# Patient Record
Sex: FEMALE | Race: ASIAN | NOT HISPANIC OR LATINO | Employment: OTHER | URBAN - METROPOLITAN AREA
[De-identification: names, ages, dates, MRNs, and addresses within clinical notes are randomized per-mention and may not be internally consistent; named-entity substitution may affect disease eponyms.]

---

## 2017-02-16 ENCOUNTER — AMBULATORY - HEALTHEAST (OUTPATIENT)
Dept: CARDIOLOGY | Facility: CLINIC | Age: 27
End: 2017-02-16

## 2017-02-16 ENCOUNTER — OFFICE VISIT - HEALTHEAST (OUTPATIENT)
Dept: CARDIOLOGY | Facility: CLINIC | Age: 27
End: 2017-02-16

## 2017-02-16 DIAGNOSIS — I43 DILATED CARDIOMYOPATHY SECONDARY TO VIRAL MYOCARDITIS (H): ICD-10-CM

## 2017-02-16 DIAGNOSIS — B33.22 DILATED CARDIOMYOPATHY SECONDARY TO VIRAL MYOCARDITIS (H): ICD-10-CM

## 2017-02-16 ASSESSMENT — MIFFLIN-ST. JEOR: SCORE: 1100.52

## 2017-05-10 ENCOUNTER — COMMUNICATION - HEALTHEAST (OUTPATIENT)
Dept: CARDIOLOGY | Facility: CLINIC | Age: 27
End: 2017-05-10

## 2017-05-10 DIAGNOSIS — I43 DILATED CARDIOMYOPATHY SECONDARY TO VIRAL MYOCARDITIS (H): ICD-10-CM

## 2017-05-10 DIAGNOSIS — B33.22 DILATED CARDIOMYOPATHY SECONDARY TO VIRAL MYOCARDITIS (H): ICD-10-CM

## 2017-05-10 DIAGNOSIS — I50.21 ACUTE SYSTOLIC CONGESTIVE HEART FAILURE (H): ICD-10-CM

## 2017-05-31 ENCOUNTER — COMMUNICATION - HEALTHEAST (OUTPATIENT)
Dept: CARDIOLOGY | Facility: CLINIC | Age: 27
End: 2017-05-31

## 2017-05-31 DIAGNOSIS — I43 DILATED CARDIOMYOPATHY SECONDARY TO VIRAL MYOCARDITIS (H): ICD-10-CM

## 2017-05-31 DIAGNOSIS — B33.22 DILATED CARDIOMYOPATHY SECONDARY TO VIRAL MYOCARDITIS (H): ICD-10-CM

## 2017-06-26 ENCOUNTER — COMMUNICATION - HEALTHEAST (OUTPATIENT)
Dept: CARDIOLOGY | Facility: CLINIC | Age: 27
End: 2017-06-26

## 2017-06-26 DIAGNOSIS — I43 DILATED CARDIOMYOPATHY SECONDARY TO VIRAL MYOCARDITIS (H): ICD-10-CM

## 2017-06-26 DIAGNOSIS — B33.22 DILATED CARDIOMYOPATHY SECONDARY TO VIRAL MYOCARDITIS (H): ICD-10-CM

## 2017-07-05 ENCOUNTER — AMBULATORY - HEALTHEAST (OUTPATIENT)
Dept: CARDIOLOGY | Facility: CLINIC | Age: 27
End: 2017-07-05

## 2017-07-10 ENCOUNTER — AMBULATORY - HEALTHEAST (OUTPATIENT)
Dept: CARDIOLOGY | Facility: CLINIC | Age: 27
End: 2017-07-10

## 2017-07-10 ENCOUNTER — OFFICE VISIT - HEALTHEAST (OUTPATIENT)
Dept: CARDIOLOGY | Facility: CLINIC | Age: 27
End: 2017-07-10

## 2017-07-10 DIAGNOSIS — I43 DILATED CARDIOMYOPATHY SECONDARY TO VIRAL MYOCARDITIS (H): ICD-10-CM

## 2017-07-10 DIAGNOSIS — B33.22 DILATED CARDIOMYOPATHY SECONDARY TO VIRAL MYOCARDITIS (H): ICD-10-CM

## 2017-07-10 DIAGNOSIS — I50.42 CHRONIC COMBINED SYSTOLIC AND DIASTOLIC HEART FAILURE (H): ICD-10-CM

## 2017-07-10 ASSESSMENT — MIFFLIN-ST. JEOR: SCORE: 1074.43

## 2017-07-13 ENCOUNTER — COMMUNICATION - HEALTHEAST (OUTPATIENT)
Dept: CARDIOLOGY | Facility: CLINIC | Age: 27
End: 2017-07-13

## 2017-07-18 ENCOUNTER — HOSPITAL ENCOUNTER (OUTPATIENT)
Dept: CARDIOLOGY | Facility: CLINIC | Age: 27
Discharge: HOME | End: 2017-07-18
Attending: INTERNAL MEDICINE

## 2017-07-18 DIAGNOSIS — I43 DILATED CARDIOMYOPATHY SECONDARY TO VIRAL MYOCARDITIS (H): ICD-10-CM

## 2017-07-18 DIAGNOSIS — B33.22 DILATED CARDIOMYOPATHY SECONDARY TO VIRAL MYOCARDITIS (H): ICD-10-CM

## 2017-07-18 ASSESSMENT — MIFFLIN-ST. JEOR: SCORE: 1074.43

## 2017-07-19 LAB
AORTIC ROOT: 2.5 CM
AORTIC VALVE MEAN VELOCITY: 96.7 CM/S
AV DIMENSIONLESS INDEX VTI: 0.7
AV MEAN GRADIENT: 4 MMHG
AV PEAK GRADIENT: 7.4 MMHG
AV VALVE AREA: 1.5 CM2
AV VELOCITY RATIO: 0.6
BSA FOR ECHO PROCEDURE: 1.42 M2
CV BLOOD PRESSURE: NORMAL MMHG
CV ECHO HEIGHT: 56.5 IN
CV ECHO WEIGHT: 111 LBS
DOP CALC AO PEAK VEL: 136 CM/S
DOP CALC AO VTI: 24.5 CM
DOP CALC LVOT AREA: 2.27 CM2
DOP CALC LVOT DIAMETER: 1.7 CM
DOP CALC LVOT PEAK VEL: 76.9 CM/S
DOP CALC LVOT STROKE VOLUME: 36.5 CM3
DOP CALCLVOT PEAK VEL VTI: 16.1 CM
EJECTION FRACTION: 45 % (ref 55–75)
FRACTIONAL SHORTENING: 27.4 % (ref 28–44)
INTERVENTRICULAR SEPTUM IN END DIASTOLE: 0.82 CM (ref 0.6–0.9)
IVS/PW RATIO: 1
LA AREA 1: 13 CM2
LA AREA 2: 9.79 CM2
LEFT ATRIUM LENGTH: 3.52 CM
LEFT ATRIUM SIZE: 2.7 CM
LEFT ATRIUM TO AORTIC ROOT RATIO: 1.08 NO UNITS
LEFT ATRIUM VOLUME INDEX: 21.6 ML/M2
LEFT ATRIUM VOLUME: 30.7 CM3
LEFT VENTRICLE CARDIAC INDEX: 1.6 L/MIN/M2
LEFT VENTRICLE CARDIAC OUTPUT: 2.3 L/MIN
LEFT VENTRICLE DIASTOLIC VOLUME INDEX: 39.4 CM3/M2 (ref 34–74)
LEFT VENTRICLE DIASTOLIC VOLUME: 56 CM3 (ref 46–106)
LEFT VENTRICLE HEART RATE: 62 BPM
LEFT VENTRICLE MASS INDEX: 80.3 G/M2
LEFT VENTRICLE SYSTOLIC VOLUME INDEX: 21.8 CM3/M2 (ref 11–31)
LEFT VENTRICLE SYSTOLIC VOLUME: 31 CM3 (ref 14–42)
LEFT VENTRICULAR INTERNAL DIMENSION IN DIASTOLE: 4.35 CM (ref 3.8–5.2)
LEFT VENTRICULAR INTERNAL DIMENSION IN SYSTOLE: 3.16 CM (ref 2.2–3.5)
LEFT VENTRICULAR MASS: 114.1 G
LEFT VENTRICULAR OUTFLOW TRACT MEAN GRADIENT: 1 MMHG
LEFT VENTRICULAR OUTFLOW TRACT MEAN VELOCITY: 55.8 CM/S
LEFT VENTRICULAR OUTFLOW TRACT PEAK GRADIENT: 2 MMHG
LEFT VENTRICULAR POSTERIOR WALL IN END DIASTOLE: 0.85 CM (ref 0.6–0.9)
LV STROKE VOLUME INDEX: 25.7 ML/M2
MITRAL VALVE E/A RATIO: 1.6
MV AVERAGE E/E' RATIO: 7.5 CM/S
MV DECELERATION TIME: 141 MS
MV E'TISSUE VEL-LAT: 13 CM/S
MV E'TISSUE VEL-MED: 11.6 CM/S
MV LATERAL E/E' RATIO: 7.1
MV MEDIAL E/E' RATIO: 8
MV PEAK A VELOCITY: 57.6 CM/S
MV PEAK E VELOCITY: 92.7 CM/S
NUC REST DIASTOLIC VOLUME INDEX: 1776 LBS
NUC REST SYSTOLIC VOLUME INDEX: 56.5 IN
RIGHT VENTRICULAR INTERNAL DIMENSION IN DYSTOLE: 2.29 CM
TRICUSPID REGURGITATION PEAK PRESSURE GRADIENT: 23.4 MMHG
TRICUSPID VALVE ANULAR PLANE SYSTOLIC EXCURSION: 1.8 CM
TRICUSPID VALVE PEAK REGURGITANT VELOCITY: 242 CM/S

## 2017-07-30 ENCOUNTER — COMMUNICATION - HEALTHEAST (OUTPATIENT)
Dept: CARDIOLOGY | Facility: CLINIC | Age: 27
End: 2017-07-30

## 2017-07-30 DIAGNOSIS — I43 DILATED CARDIOMYOPATHY SECONDARY TO VIRAL MYOCARDITIS (H): ICD-10-CM

## 2017-07-30 DIAGNOSIS — B33.22 DILATED CARDIOMYOPATHY SECONDARY TO VIRAL MYOCARDITIS (H): ICD-10-CM

## 2017-10-07 ENCOUNTER — COMMUNICATION - HEALTHEAST (OUTPATIENT)
Dept: CARDIOLOGY | Facility: CLINIC | Age: 27
End: 2017-10-07

## 2017-10-07 DIAGNOSIS — I43 DILATED CARDIOMYOPATHY SECONDARY TO VIRAL MYOCARDITIS (H): ICD-10-CM

## 2017-10-07 DIAGNOSIS — B33.22 DILATED CARDIOMYOPATHY SECONDARY TO VIRAL MYOCARDITIS (H): ICD-10-CM

## 2017-11-06 ENCOUNTER — COMMUNICATION - HEALTHEAST (OUTPATIENT)
Dept: CARDIOLOGY | Facility: CLINIC | Age: 27
End: 2017-11-06

## 2017-11-06 DIAGNOSIS — I43 DILATED CARDIOMYOPATHY SECONDARY TO VIRAL MYOCARDITIS (H): ICD-10-CM

## 2017-11-06 DIAGNOSIS — B33.22 DILATED CARDIOMYOPATHY SECONDARY TO VIRAL MYOCARDITIS (H): ICD-10-CM

## 2017-11-06 DIAGNOSIS — I50.21 ACUTE SYSTOLIC CONGESTIVE HEART FAILURE (H): ICD-10-CM

## 2017-12-18 ENCOUNTER — COMMUNICATION - HEALTHEAST (OUTPATIENT)
Dept: CARDIOLOGY | Facility: CLINIC | Age: 27
End: 2017-12-18

## 2017-12-18 DIAGNOSIS — B33.22 DILATED CARDIOMYOPATHY SECONDARY TO VIRAL MYOCARDITIS (H): ICD-10-CM

## 2017-12-18 DIAGNOSIS — I43 DILATED CARDIOMYOPATHY SECONDARY TO VIRAL MYOCARDITIS (H): ICD-10-CM

## 2018-01-12 ENCOUNTER — COMMUNICATION - HEALTHEAST (OUTPATIENT)
Dept: ADMINISTRATIVE | Facility: CLINIC | Age: 28
End: 2018-01-12

## 2018-02-22 ENCOUNTER — RECORDS - HEALTHEAST (OUTPATIENT)
Dept: ADMINISTRATIVE | Facility: OTHER | Age: 28
End: 2018-02-22

## 2018-02-27 ENCOUNTER — OFFICE VISIT - HEALTHEAST (OUTPATIENT)
Dept: CARDIOLOGY | Facility: CLINIC | Age: 28
End: 2018-02-27

## 2018-02-27 DIAGNOSIS — B33.22 DILATED CARDIOMYOPATHY SECONDARY TO VIRAL MYOCARDITIS (H): ICD-10-CM

## 2018-02-27 DIAGNOSIS — I43 DILATED CARDIOMYOPATHY SECONDARY TO VIRAL MYOCARDITIS (H): ICD-10-CM

## 2018-02-27 LAB
ANION GAP SERPL CALCULATED.3IONS-SCNC: 11 MMOL/L (ref 5–18)
BUN SERPL-MCNC: 7 MG/DL (ref 8–22)
CALCIUM SERPL-MCNC: 9.1 MG/DL (ref 8.5–10.5)
CHLORIDE BLD-SCNC: 104 MMOL/L (ref 98–107)
CO2 SERPL-SCNC: 25 MMOL/L (ref 22–31)
CREAT SERPL-MCNC: 0.58 MG/DL (ref 0.6–1.1)
GFR SERPL CREATININE-BSD FRML MDRD: >60 ML/MIN/1.73M2
GLUCOSE BLD-MCNC: 109 MG/DL (ref 70–125)
POTASSIUM BLD-SCNC: 3.6 MMOL/L (ref 3.5–5)
SODIUM SERPL-SCNC: 140 MMOL/L (ref 136–145)

## 2018-02-27 ASSESSMENT — MIFFLIN-ST. JEOR: SCORE: 1063.09

## 2018-03-17 ENCOUNTER — COMMUNICATION - HEALTHEAST (OUTPATIENT)
Dept: CARDIOLOGY | Facility: CLINIC | Age: 28
End: 2018-03-17

## 2018-03-17 DIAGNOSIS — I43 DILATED CARDIOMYOPATHY SECONDARY TO VIRAL MYOCARDITIS (H): ICD-10-CM

## 2018-03-17 DIAGNOSIS — B33.22 DILATED CARDIOMYOPATHY SECONDARY TO VIRAL MYOCARDITIS (H): ICD-10-CM

## 2018-04-26 ENCOUNTER — COMMUNICATION - HEALTHEAST (OUTPATIENT)
Dept: CARDIOLOGY | Facility: CLINIC | Age: 28
End: 2018-04-26

## 2018-04-26 DIAGNOSIS — B33.22 DILATED CARDIOMYOPATHY SECONDARY TO VIRAL MYOCARDITIS (H): ICD-10-CM

## 2018-04-26 DIAGNOSIS — I43 DILATED CARDIOMYOPATHY SECONDARY TO VIRAL MYOCARDITIS (H): ICD-10-CM

## 2018-05-14 ENCOUNTER — COMMUNICATION - HEALTHEAST (OUTPATIENT)
Dept: CARDIOLOGY | Facility: CLINIC | Age: 28
End: 2018-05-14

## 2018-05-14 DIAGNOSIS — I43 DILATED CARDIOMYOPATHY SECONDARY TO VIRAL MYOCARDITIS (H): ICD-10-CM

## 2018-05-14 DIAGNOSIS — B33.22 DILATED CARDIOMYOPATHY SECONDARY TO VIRAL MYOCARDITIS (H): ICD-10-CM

## 2018-06-29 ENCOUNTER — COMMUNICATION - HEALTHEAST (OUTPATIENT)
Dept: ADMINISTRATIVE | Facility: CLINIC | Age: 28
End: 2018-06-29

## 2018-07-22 ENCOUNTER — COMMUNICATION - HEALTHEAST (OUTPATIENT)
Dept: CARDIOLOGY | Facility: CLINIC | Age: 28
End: 2018-07-22

## 2018-07-22 DIAGNOSIS — I43 DILATED CARDIOMYOPATHY SECONDARY TO VIRAL MYOCARDITIS (H): ICD-10-CM

## 2018-07-22 DIAGNOSIS — B33.22 DILATED CARDIOMYOPATHY SECONDARY TO VIRAL MYOCARDITIS (H): ICD-10-CM

## 2018-08-08 ENCOUNTER — OFFICE VISIT - HEALTHEAST (OUTPATIENT)
Dept: CARDIOLOGY | Facility: CLINIC | Age: 28
End: 2018-08-08

## 2018-08-08 ENCOUNTER — HOSPITAL ENCOUNTER (OUTPATIENT)
Dept: RADIOLOGY | Facility: CLINIC | Age: 28
Discharge: HOME | End: 2018-08-08
Attending: INTERNAL MEDICINE

## 2018-08-08 ENCOUNTER — AMBULATORY - HEALTHEAST (OUTPATIENT)
Dept: LAB | Facility: CLINIC | Age: 28
End: 2018-08-08

## 2018-08-08 DIAGNOSIS — I43 DILATED CARDIOMYOPATHY SECONDARY TO VIRAL MYOCARDITIS (H): ICD-10-CM

## 2018-08-08 DIAGNOSIS — B33.22 DILATED CARDIOMYOPATHY SECONDARY TO VIRAL MYOCARDITIS (H): ICD-10-CM

## 2018-08-08 LAB — D DIMER PPP FEU-MCNC: <0.27 FEU UG/ML

## 2018-08-08 ASSESSMENT — MIFFLIN-ST. JEOR: SCORE: 1058.55

## 2018-08-09 ENCOUNTER — COMMUNICATION - HEALTHEAST (OUTPATIENT)
Dept: CARDIOLOGY | Facility: CLINIC | Age: 28
End: 2018-08-09

## 2018-08-21 ENCOUNTER — HOSPITAL ENCOUNTER (OUTPATIENT)
Dept: CARDIOLOGY | Facility: CLINIC | Age: 28
Discharge: HOME | End: 2018-08-21
Attending: INTERNAL MEDICINE

## 2018-08-21 DIAGNOSIS — I43 DILATED CARDIOMYOPATHY SECONDARY TO VIRAL MYOCARDITIS (H): ICD-10-CM

## 2018-08-21 DIAGNOSIS — B33.22 DILATED CARDIOMYOPATHY SECONDARY TO VIRAL MYOCARDITIS (H): ICD-10-CM

## 2018-08-21 LAB
AORTIC ROOT: 2 CM
BSA FOR ECHO PROCEDURE: 1.38 M2
CV BLOOD PRESSURE: NORMAL MMHG
CV ECHO HEIGHT: 57.5 IN
CV ECHO WEIGHT: 104 LBS
DOP CALC LVOT PEAK VEL: 66.6 CM/S
DOP CALCLVOT PEAK VEL VTI: 12.9 CM
EJECTION FRACTION: 50 % (ref 55–75)
FRACTIONAL SHORTENING: 34 % (ref 28–44)
INTERVENTRICULAR SEPTUM IN END DIASTOLE: 0.85 CM (ref 0.6–0.9)
IVS/PW RATIO: 1.2
LA AREA 1: 8.21 CM2
LA AREA 2: 9.38 CM2
LEFT ATRIUM LENGTH: 3.71 CM
LEFT ATRIUM SIZE: 3 CM
LEFT ATRIUM TO AORTIC ROOT RATIO: 1.5 NO UNITS
LEFT ATRIUM VOLUME INDEX: 12.8 ML/M2
LEFT ATRIUM VOLUME: 17.6 ML
LEFT VENTRICLE DIASTOLIC VOLUME INDEX: 24.6 CM3/M2 (ref 34–74)
LEFT VENTRICLE DIASTOLIC VOLUME: 34 CM3 (ref 46–106)
LEFT VENTRICLE HEART RATE: 79 BPM
LEFT VENTRICLE MASS INDEX: 83 G/M2
LEFT VENTRICLE SYSTOLIC VOLUME INDEX: 12.3 CM3/M2 (ref 11–31)
LEFT VENTRICLE SYSTOLIC VOLUME: 17 CM3 (ref 14–42)
LEFT VENTRICULAR INTERNAL DIMENSION IN DIASTOLE: 4.59 CM (ref 3.8–5.2)
LEFT VENTRICULAR INTERNAL DIMENSION IN SYSTOLE: 3.03 CM (ref 2.2–3.5)
LEFT VENTRICULAR MASS: 114.5 G
LEFT VENTRICULAR OUTFLOW TRACT MEAN GRADIENT: 1 MMHG
LEFT VENTRICULAR OUTFLOW TRACT MEAN VELOCITY: 50.8 CM/S
LEFT VENTRICULAR OUTFLOW TRACT PEAK GRADIENT: 2 MMHG
LEFT VENTRICULAR POSTERIOR WALL IN END DIASTOLE: 0.72 CM (ref 0.6–0.9)
MITRAL VALVE DECELERATION SLOPE: 4790 MM/S2
MITRAL VALVE E/A RATIO: 1.4
MITRAL VALVE PRESSURE HALF-TIME: 66 MS
MV AVERAGE E/E' RATIO: 8.3 CM/S
MV DECELERATION TIME: 190 MS
MV E'TISSUE VEL-LAT: 12.7 CM/S
MV E'TISSUE VEL-MED: 7.12 CM/S
MV LATERAL E/E' RATIO: 6.4
MV MEDIAL E/E' RATIO: 11.5
MV PEAK A VELOCITY: 58.7 CM/S
MV PEAK E VELOCITY: 81.9 CM/S
MV VALVE AREA PRESSURE 1/2 METHOD: 3.3 CM2
NUC REST DIASTOLIC VOLUME INDEX: 1664 LBS
NUC REST SYSTOLIC VOLUME INDEX: 57.5 IN
TRICUSPID REGURGITATION PEAK PRESSURE GRADIENT: 20.6 MMHG
TRICUSPID VALVE PEAK REGURGITANT VELOCITY: 227 CM/S

## 2018-08-21 ASSESSMENT — MIFFLIN-ST. JEOR: SCORE: 1058.55

## 2018-08-23 ENCOUNTER — COMMUNICATION - HEALTHEAST (OUTPATIENT)
Dept: CARDIOLOGY | Facility: CLINIC | Age: 28
End: 2018-08-23

## 2018-11-26 ENCOUNTER — COMMUNICATION - HEALTHEAST (OUTPATIENT)
Dept: ADMINISTRATIVE | Facility: CLINIC | Age: 28
End: 2018-11-26

## 2018-12-11 ENCOUNTER — COMMUNICATION - HEALTHEAST (OUTPATIENT)
Dept: CARDIOLOGY | Facility: CLINIC | Age: 28
End: 2018-12-11

## 2018-12-11 DIAGNOSIS — I43 DILATED CARDIOMYOPATHY SECONDARY TO VIRAL MYOCARDITIS (H): ICD-10-CM

## 2018-12-11 DIAGNOSIS — B33.22 DILATED CARDIOMYOPATHY SECONDARY TO VIRAL MYOCARDITIS (H): ICD-10-CM

## 2018-12-12 ENCOUNTER — COMMUNICATION - HEALTHEAST (OUTPATIENT)
Dept: CARDIOLOGY | Facility: CLINIC | Age: 28
End: 2018-12-12

## 2018-12-12 DIAGNOSIS — I50.21 ACUTE SYSTOLIC CONGESTIVE HEART FAILURE (H): ICD-10-CM

## 2018-12-12 DIAGNOSIS — B33.22 DILATED CARDIOMYOPATHY SECONDARY TO VIRAL MYOCARDITIS (H): ICD-10-CM

## 2018-12-12 DIAGNOSIS — I43 DILATED CARDIOMYOPATHY SECONDARY TO VIRAL MYOCARDITIS (H): ICD-10-CM

## 2019-02-25 ENCOUNTER — OFFICE VISIT - HEALTHEAST (OUTPATIENT)
Dept: CARDIOLOGY | Facility: CLINIC | Age: 29
End: 2019-02-25

## 2019-02-25 DIAGNOSIS — I43 DILATED CARDIOMYOPATHY SECONDARY TO VIRAL MYOCARDITIS (H): ICD-10-CM

## 2019-02-25 DIAGNOSIS — B33.22 DILATED CARDIOMYOPATHY SECONDARY TO VIRAL MYOCARDITIS (H): ICD-10-CM

## 2019-02-25 ASSESSMENT — MIFFLIN-ST. JEOR: SCORE: 1063.09

## 2019-03-08 ENCOUNTER — COMMUNICATION - HEALTHEAST (OUTPATIENT)
Dept: CARDIOLOGY | Facility: CLINIC | Age: 29
End: 2019-03-08

## 2019-03-08 DIAGNOSIS — B33.22 DILATED CARDIOMYOPATHY SECONDARY TO VIRAL MYOCARDITIS (H): ICD-10-CM

## 2019-03-08 DIAGNOSIS — I43 DILATED CARDIOMYOPATHY SECONDARY TO VIRAL MYOCARDITIS (H): ICD-10-CM

## 2019-03-08 DIAGNOSIS — I50.21 ACUTE SYSTOLIC CONGESTIVE HEART FAILURE (H): ICD-10-CM

## 2019-04-02 ENCOUNTER — COMMUNICATION - HEALTHEAST (OUTPATIENT)
Dept: CARDIOLOGY | Facility: CLINIC | Age: 29
End: 2019-04-02

## 2019-04-02 DIAGNOSIS — B33.22 DILATED CARDIOMYOPATHY SECONDARY TO VIRAL MYOCARDITIS (H): ICD-10-CM

## 2019-04-02 DIAGNOSIS — I43 DILATED CARDIOMYOPATHY SECONDARY TO VIRAL MYOCARDITIS (H): ICD-10-CM

## 2019-06-06 ENCOUNTER — OFFICE VISIT - HEALTHEAST (OUTPATIENT)
Dept: CARDIOLOGY | Facility: CLINIC | Age: 29
End: 2019-06-06

## 2019-06-06 DIAGNOSIS — Z01.810 PREOPERATIVE CARDIOVASCULAR EXAMINATION: ICD-10-CM

## 2019-06-06 DIAGNOSIS — I42.8 NONISCHEMIC CARDIOMYOPATHY (H): ICD-10-CM

## 2019-06-06 ASSESSMENT — MIFFLIN-ST. JEOR: SCORE: 1067.62

## 2019-06-11 ASSESSMENT — MIFFLIN-ST. JEOR: SCORE: 1043.81

## 2019-06-21 ASSESSMENT — MIFFLIN-ST. JEOR: SCORE: 1043.81

## 2019-06-25 ENCOUNTER — ANESTHESIA - HEALTHEAST (OUTPATIENT)
Dept: SURGERY | Facility: HOSPITAL | Age: 29
End: 2019-06-25

## 2019-06-25 ENCOUNTER — SURGERY - HEALTHEAST (OUTPATIENT)
Dept: SURGERY | Facility: HOSPITAL | Age: 29
End: 2019-06-25

## 2019-06-25 ENCOUNTER — AMBULATORY - HEALTHEAST (OUTPATIENT)
Dept: OTHER | Facility: CLINIC | Age: 29
End: 2019-06-25

## 2019-07-24 ENCOUNTER — COMMUNICATION - HEALTHEAST (OUTPATIENT)
Dept: ADMINISTRATIVE | Facility: CLINIC | Age: 29
End: 2019-07-24

## 2019-07-25 ENCOUNTER — COMMUNICATION - HEALTHEAST (OUTPATIENT)
Dept: ADMINISTRATIVE | Facility: CLINIC | Age: 29
End: 2019-07-25

## 2019-09-26 ENCOUNTER — COMMUNICATION - HEALTHEAST (OUTPATIENT)
Dept: CARDIOLOGY | Facility: CLINIC | Age: 29
End: 2019-09-26

## 2019-09-26 DIAGNOSIS — I50.21 ACUTE SYSTOLIC CONGESTIVE HEART FAILURE (H): ICD-10-CM

## 2019-09-26 DIAGNOSIS — I43 DILATED CARDIOMYOPATHY SECONDARY TO VIRAL MYOCARDITIS (H): ICD-10-CM

## 2019-09-26 DIAGNOSIS — B33.22 DILATED CARDIOMYOPATHY SECONDARY TO VIRAL MYOCARDITIS (H): ICD-10-CM

## 2019-09-30 ENCOUNTER — RECORDS - HEALTHEAST (OUTPATIENT)
Dept: ADMINISTRATIVE | Facility: OTHER | Age: 29
End: 2019-09-30

## 2019-09-30 ENCOUNTER — AMBULATORY - HEALTHEAST (OUTPATIENT)
Dept: CARDIOLOGY | Facility: CLINIC | Age: 29
End: 2019-09-30

## 2019-10-02 ENCOUNTER — OFFICE VISIT - HEALTHEAST (OUTPATIENT)
Dept: CARDIOLOGY | Facility: CLINIC | Age: 29
End: 2019-10-02

## 2019-10-02 DIAGNOSIS — I50.22 CHRONIC SYSTOLIC CONGESTIVE HEART FAILURE (H): ICD-10-CM

## 2019-10-02 LAB — BNP SERPL-MCNC: <10 PG/ML (ref 0–64)

## 2019-10-02 ASSESSMENT — MIFFLIN-ST. JEOR: SCORE: 1052.88

## 2019-10-03 ENCOUNTER — COMMUNICATION - HEALTHEAST (OUTPATIENT)
Dept: CARDIOLOGY | Facility: CLINIC | Age: 29
End: 2019-10-03

## 2019-10-11 ENCOUNTER — HOSPITAL ENCOUNTER (OUTPATIENT)
Dept: CARDIOLOGY | Facility: CLINIC | Age: 29
Discharge: HOME | End: 2019-10-11
Attending: INTERNAL MEDICINE

## 2019-10-11 DIAGNOSIS — I50.22 CHRONIC SYSTOLIC CONGESTIVE HEART FAILURE (H): ICD-10-CM

## 2019-10-11 ASSESSMENT — MIFFLIN-ST. JEOR: SCORE: 1052.88

## 2019-10-14 ENCOUNTER — COMMUNICATION - HEALTHEAST (OUTPATIENT)
Dept: CARDIOLOGY | Facility: CLINIC | Age: 29
End: 2019-10-14

## 2019-10-14 LAB
AORTIC ROOT: 2 CM
BSA FOR ECHO PROCEDURE: 1.39 M2
CV BLOOD PRESSURE: NORMAL MMHG
CV ECHO HEIGHT: 56 IN
CV ECHO WEIGHT: 108 LBS
DOP CALC LVOT AREA: 2.01 CM2
DOP CALC LVOT DIAMETER: 1.6 CM
DOP CALC LVOT PEAK VEL: 60.9 CM/S
DOP CALC LVOT STROKE VOLUME: 23.9 CM3
DOP CALCLVOT PEAK VEL VTI: 11.9 CM
ECHO EJECTION FRACTION ESTIMATED: 55 %
EJECTION FRACTION: 53 % (ref 55–75)
FRACTIONAL SHORTENING: 30.4 % (ref 28–44)
INTERVENTRICULAR SEPTUM IN END DIASTOLE: 0.79 CM (ref 0.6–0.9)
IVS/PW RATIO: 1.1
LA AREA 1: 13 CM2
LA AREA 2: 10.4 CM2
LEFT ATRIUM LENGTH: 3.91 CM
LEFT ATRIUM SIZE: 3.1 CM
LEFT ATRIUM TO AORTIC ROOT RATIO: 1.55 NO UNITS
LEFT ATRIUM VOLUME INDEX: 21.1 ML/M2
LEFT ATRIUM VOLUME: 29.4 ML
LEFT VENTRICLE CARDIAC INDEX: 1.3 L/MIN/M2
LEFT VENTRICLE CARDIAC OUTPUT: 1.8 L/MIN
LEFT VENTRICLE DIASTOLIC VOLUME INDEX: 33.8 CM3/M2 (ref 29–61)
LEFT VENTRICLE DIASTOLIC VOLUME: 47 CM3 (ref 46–106)
LEFT VENTRICLE HEART RATE: 74 BPM
LEFT VENTRICLE MASS INDEX: 71.6 G/M2
LEFT VENTRICLE SYSTOLIC VOLUME INDEX: 15.8 CM3/M2 (ref 8–24)
LEFT VENTRICLE SYSTOLIC VOLUME: 22 CM3 (ref 14–42)
LEFT VENTRICULAR INTERNAL DIMENSION IN DIASTOLE: 4.41 CM (ref 3.8–5.2)
LEFT VENTRICULAR INTERNAL DIMENSION IN SYSTOLE: 3.07 CM (ref 2.2–3.5)
LEFT VENTRICULAR MASS: 99.5 G
LEFT VENTRICULAR OUTFLOW TRACT MEAN GRADIENT: 1 MMHG
LEFT VENTRICULAR OUTFLOW TRACT MEAN VELOCITY: 45.6 CM/S
LEFT VENTRICULAR OUTFLOW TRACT PEAK GRADIENT: 1 MMHG
LEFT VENTRICULAR POSTERIOR WALL IN END DIASTOLE: 0.69 CM (ref 0.6–0.9)
LV STROKE VOLUME INDEX: 17.2 ML/M2
MITRAL VALVE E/A RATIO: 1.6
MV AVERAGE E/E' RATIO: 9.2 CM/S
MV E'TISSUE VEL-LAT: 12 CM/S
MV E'TISSUE VEL-MED: 8.68 CM/S
MV LATERAL E/E' RATIO: 7.9
MV MEDIAL E/E' RATIO: 11
MV PEAK A VELOCITY: 61.2 CM/S
MV PEAK E VELOCITY: 95.3 CM/S
NUC REST DIASTOLIC VOLUME INDEX: 1728 LBS
NUC REST SYSTOLIC VOLUME INDEX: 56 IN
TRICUSPID REGURGITATION PEAK PRESSURE GRADIENT: 22.3 MMHG
TRICUSPID VALVE ANULAR PLANE SYSTOLIC EXCURSION: 2 CM
TRICUSPID VALVE PEAK REGURGITANT VELOCITY: 236 CM/S

## 2019-12-09 ENCOUNTER — COMMUNICATION - HEALTHEAST (OUTPATIENT)
Dept: CARDIOLOGY | Facility: CLINIC | Age: 29
End: 2019-12-09

## 2019-12-09 DIAGNOSIS — I43 DILATED CARDIOMYOPATHY SECONDARY TO VIRAL MYOCARDITIS (H): ICD-10-CM

## 2019-12-09 DIAGNOSIS — B33.22 DILATED CARDIOMYOPATHY SECONDARY TO VIRAL MYOCARDITIS (H): ICD-10-CM

## 2019-12-16 ENCOUNTER — COMMUNICATION - HEALTHEAST (OUTPATIENT)
Dept: CARDIOLOGY | Facility: CLINIC | Age: 29
End: 2019-12-16

## 2019-12-16 DIAGNOSIS — B33.22 DILATED CARDIOMYOPATHY SECONDARY TO VIRAL MYOCARDITIS (H): ICD-10-CM

## 2019-12-16 DIAGNOSIS — I43 DILATED CARDIOMYOPATHY SECONDARY TO VIRAL MYOCARDITIS (H): ICD-10-CM

## 2019-12-21 ENCOUNTER — COMMUNICATION - HEALTHEAST (OUTPATIENT)
Dept: CARDIOLOGY | Facility: CLINIC | Age: 29
End: 2019-12-21

## 2019-12-21 DIAGNOSIS — I43 DILATED CARDIOMYOPATHY SECONDARY TO VIRAL MYOCARDITIS (H): ICD-10-CM

## 2019-12-21 DIAGNOSIS — B33.22 DILATED CARDIOMYOPATHY SECONDARY TO VIRAL MYOCARDITIS (H): ICD-10-CM

## 2020-01-17 ENCOUNTER — RECORDS - HEALTHEAST (OUTPATIENT)
Dept: LAB | Facility: CLINIC | Age: 30
End: 2020-01-17

## 2020-01-17 LAB
ALBUMIN SERPL-MCNC: 4.1 G/DL (ref 3.5–5)
ALP SERPL-CCNC: 63 U/L (ref 45–120)
ALT SERPL W P-5'-P-CCNC: 21 U/L (ref 0–45)
ANION GAP SERPL CALCULATED.3IONS-SCNC: 15 MMOL/L (ref 5–18)
AST SERPL W P-5'-P-CCNC: 19 U/L (ref 0–40)
BILIRUB SERPL-MCNC: 0.4 MG/DL (ref 0–1)
BUN SERPL-MCNC: 6 MG/DL (ref 8–22)
CALCIUM SERPL-MCNC: 9.2 MG/DL (ref 8.5–10.5)
CHLORIDE BLD-SCNC: 103 MMOL/L (ref 98–107)
CO2 SERPL-SCNC: 21 MMOL/L (ref 22–31)
CREAT SERPL-MCNC: 0.52 MG/DL (ref 0.6–1.1)
GFR SERPL CREATININE-BSD FRML MDRD: >60 ML/MIN/1.73M2
GLUCOSE BLD-MCNC: 85 MG/DL (ref 70–125)
POTASSIUM BLD-SCNC: 4.3 MMOL/L (ref 3.5–5)
PROT SERPL-MCNC: 7.4 G/DL (ref 6–8)
SODIUM SERPL-SCNC: 139 MMOL/L (ref 136–145)

## 2020-01-18 LAB — BACTERIA SPEC CULT: NO GROWTH

## 2020-01-20 ENCOUNTER — RECORDS - HEALTHEAST (OUTPATIENT)
Dept: LAB | Facility: CLINIC | Age: 30
End: 2020-01-20

## 2020-01-22 ENCOUNTER — COMMUNICATION - HEALTHEAST (OUTPATIENT)
Dept: CARDIOLOGY | Facility: CLINIC | Age: 30
End: 2020-01-22

## 2020-01-22 LAB
BASOPHILS # BLD AUTO: 0.1 THOU/UL (ref 0–0.2)
BASOPHILS NFR BLD AUTO: 1 % (ref 0–2)
EOSINOPHIL # BLD AUTO: 0.2 THOU/UL (ref 0–0.4)
EOSINOPHIL NFR BLD AUTO: 3 % (ref 0–6)
ERYTHROCYTE [DISTWIDTH] IN BLOOD BY AUTOMATED COUNT: 12.2 % (ref 11–14.5)
HCT VFR BLD AUTO: 41 % (ref 35–47)
HGB BLD-MCNC: 13.3 G/DL (ref 12–16)
LYMPHOCYTES # BLD AUTO: 2.3 THOU/UL (ref 0.8–4.4)
LYMPHOCYTES NFR BLD AUTO: 35 % (ref 20–40)
MCH RBC QN AUTO: 29 PG (ref 27–34)
MCHC RBC AUTO-ENTMCNC: 32.4 G/DL (ref 32–36)
MCV RBC AUTO: 90 FL (ref 80–100)
MONOCYTES # BLD AUTO: 0.5 THOU/UL (ref 0–0.9)
MONOCYTES NFR BLD AUTO: 8 % (ref 2–10)
NEUTROPHILS # BLD AUTO: 3.4 THOU/UL (ref 2–7.7)
NEUTROPHILS NFR BLD AUTO: 52 % (ref 50–70)
PLATELET # BLD AUTO: 333 THOU/UL (ref 140–440)
PMV BLD AUTO: 9.5 FL (ref 8.5–12.5)
RBC # BLD AUTO: 4.58 MILL/UL (ref 3.8–5.4)
WBC: 6.5 THOU/UL (ref 4–11)

## 2020-04-14 ENCOUNTER — RECORDS - HEALTHEAST (OUTPATIENT)
Dept: ADMINISTRATIVE | Facility: OTHER | Age: 30
End: 2020-04-14

## 2020-04-14 ENCOUNTER — AMBULATORY - HEALTHEAST (OUTPATIENT)
Dept: CARDIOLOGY | Facility: CLINIC | Age: 30
End: 2020-04-14

## 2020-04-20 ENCOUNTER — OFFICE VISIT - HEALTHEAST (OUTPATIENT)
Dept: CARDIOLOGY | Facility: CLINIC | Age: 30
End: 2020-04-20

## 2020-04-20 DIAGNOSIS — B33.22 DILATED CARDIOMYOPATHY SECONDARY TO VIRAL MYOCARDITIS (H): ICD-10-CM

## 2020-04-20 DIAGNOSIS — I50.22 CHRONIC SYSTOLIC CONGESTIVE HEART FAILURE (H): ICD-10-CM

## 2020-04-20 DIAGNOSIS — I43 DILATED CARDIOMYOPATHY SECONDARY TO VIRAL MYOCARDITIS (H): ICD-10-CM

## 2020-06-15 ENCOUNTER — COMMUNICATION - HEALTHEAST (OUTPATIENT)
Dept: CARDIOLOGY | Facility: CLINIC | Age: 30
End: 2020-06-15

## 2020-06-15 DIAGNOSIS — I42.9 CARDIOMYOPATHY (H): ICD-10-CM

## 2020-09-08 ENCOUNTER — COMMUNICATION - HEALTHEAST (OUTPATIENT)
Dept: CARDIOLOGY | Facility: CLINIC | Age: 30
End: 2020-09-08

## 2020-09-08 DIAGNOSIS — B33.22 DILATED CARDIOMYOPATHY SECONDARY TO VIRAL MYOCARDITIS (H): ICD-10-CM

## 2020-09-08 DIAGNOSIS — I43 DILATED CARDIOMYOPATHY SECONDARY TO VIRAL MYOCARDITIS (H): ICD-10-CM

## 2020-09-09 ENCOUNTER — COMMUNICATION - HEALTHEAST (OUTPATIENT)
Dept: CARDIOLOGY | Facility: CLINIC | Age: 30
End: 2020-09-09

## 2020-09-09 DIAGNOSIS — I42.9 CARDIOMYOPATHY (H): ICD-10-CM

## 2020-09-15 ENCOUNTER — AMBULATORY - HEALTHEAST (OUTPATIENT)
Dept: CARDIOLOGY | Facility: CLINIC | Age: 30
End: 2020-09-15

## 2020-09-15 DIAGNOSIS — I43 DILATED CARDIOMYOPATHY SECONDARY TO VIRAL MYOCARDITIS (H): ICD-10-CM

## 2020-09-15 DIAGNOSIS — B33.22 DILATED CARDIOMYOPATHY SECONDARY TO VIRAL MYOCARDITIS (H): ICD-10-CM

## 2020-10-07 ENCOUNTER — RECORDS - HEALTHEAST (OUTPATIENT)
Dept: ADMINISTRATIVE | Facility: OTHER | Age: 30
End: 2020-10-07

## 2020-10-12 ENCOUNTER — OFFICE VISIT - HEALTHEAST (OUTPATIENT)
Dept: CARDIOLOGY | Facility: CLINIC | Age: 30
End: 2020-10-12

## 2020-10-12 DIAGNOSIS — I50.22 CHRONIC SYSTOLIC CONGESTIVE HEART FAILURE (H): ICD-10-CM

## 2020-10-12 ASSESSMENT — MIFFLIN-ST. JEOR: SCORE: 1065.34

## 2020-11-13 ENCOUNTER — HOSPITAL ENCOUNTER (OUTPATIENT)
Dept: CARDIOLOGY | Facility: CLINIC | Age: 30
Discharge: HOME | End: 2020-11-13
Attending: INTERNAL MEDICINE

## 2020-11-13 DIAGNOSIS — I50.22 CHRONIC SYSTOLIC CONGESTIVE HEART FAILURE (H): ICD-10-CM

## 2020-11-13 LAB
AORTIC ROOT: 2.9 CM
AORTIC VALVE MEAN VELOCITY: 98.9 CM/S
ASCENDING AORTA: 1.8 CM
AV DIMENSIONLESS INDEX VTI: 0.5
AV MEAN GRADIENT: 4 MMHG
AV PEAK GRADIENT: 6.9 MMHG
AV VALVE AREA: 1.2 CM2
AV VELOCITY RATIO: 0.5
BSA FOR ECHO PROCEDURE: 1.39 M2
CV BLOOD PRESSURE: ABNORMAL MMHG
CV ECHO HEIGHT: 55 IN
CV ECHO WEIGHT: 110 LBS
DOP CALC AO PEAK VEL: 131 CM/S
DOP CALC AO VTI: 28.9 CM
DOP CALC LVOT AREA: 2.54 CM2
DOP CALC LVOT DIAMETER: 1.8 CM
DOP CALC LVOT PEAK VEL: 63.2 CM/S
DOP CALC LVOT STROKE VOLUME: 35.1 CM3
DOP CALCLVOT PEAK VEL VTI: 13.8 CM
EJECTION FRACTION: 58 % (ref 55–75)
FRACTIONAL SHORTENING: 27 % (ref 28–44)
INTERVENTRICULAR SEPTUM IN END DIASTOLE: 0.75 CM (ref 0.6–0.9)
IVS/PW RATIO: 1.1
LA AREA 1: 13.8 CM2
LA AREA 2: 14.1 CM2
LEFT ATRIUM LENGTH: 3.98 CM
LEFT ATRIUM SIZE: 2.7 CM
LEFT ATRIUM VOLUME INDEX: 29.9 ML/M2
LEFT ATRIUM VOLUME: 41.6 ML
LEFT VENTRICLE CARDIAC INDEX: 1.9 L/MIN/M2
LEFT VENTRICLE CARDIAC OUTPUT: 2.6 L/MIN
LEFT VENTRICLE DIASTOLIC VOLUME INDEX: 58.3 CM3/M2 (ref 29–61)
LEFT VENTRICLE DIASTOLIC VOLUME: 81 CM3 (ref 46–106)
LEFT VENTRICLE HEART RATE: 74 BPM
LEFT VENTRICLE MASS INDEX: 80.3 G/M2
LEFT VENTRICLE SYSTOLIC VOLUME INDEX: 24.5 CM3/M2 (ref 8–24)
LEFT VENTRICLE SYSTOLIC VOLUME: 34 CM3 (ref 14–42)
LEFT VENTRICULAR INTERNAL DIMENSION IN DIASTOLE: 4.92 CM (ref 3.8–5.2)
LEFT VENTRICULAR INTERNAL DIMENSION IN SYSTOLE: 3.59 CM (ref 2.2–3.5)
LEFT VENTRICULAR MASS: 111.6 G
LEFT VENTRICULAR OUTFLOW TRACT MEAN GRADIENT: 1 MMHG
LEFT VENTRICULAR OUTFLOW TRACT MEAN VELOCITY: 40.5 CM/S
LEFT VENTRICULAR OUTFLOW TRACT PEAK GRADIENT: 2 MMHG
LEFT VENTRICULAR POSTERIOR WALL IN END DIASTOLE: 0.65 CM (ref 0.6–0.9)
LV STROKE VOLUME INDEX: 25.3 ML/M2
MITRAL VALVE E/A RATIO: 2.2
MV AVERAGE E/E' RATIO: 9.8 CM/S
MV DECELERATION TIME: 176 MS
MV E'TISSUE VEL-LAT: 13.3 CM/S
MV E'TISSUE VEL-MED: 7.8 CM/S
MV LATERAL E/E' RATIO: 7.7
MV MEDIAL E/E' RATIO: 13.2
MV PEAK A VELOCITY: 45.9 CM/S
MV PEAK E VELOCITY: 103 CM/S
NUC REST DIASTOLIC VOLUME INDEX: 1760 LBS
NUC REST SYSTOLIC VOLUME INDEX: 55 IN
TRICUSPID REGURGITATION PEAK PRESSURE GRADIENT: 24.2 MMHG
TRICUSPID VALVE PEAK REGURGITANT VELOCITY: 246 CM/S

## 2020-11-13 ASSESSMENT — MIFFLIN-ST. JEOR: SCORE: 1046.09

## 2021-03-03 ENCOUNTER — COMMUNICATION - HEALTHEAST (OUTPATIENT)
Dept: CARDIOLOGY | Facility: CLINIC | Age: 31
End: 2021-03-03

## 2021-03-03 DIAGNOSIS — B33.22 DILATED CARDIOMYOPATHY SECONDARY TO VIRAL MYOCARDITIS (H): ICD-10-CM

## 2021-03-03 DIAGNOSIS — I43 DILATED CARDIOMYOPATHY SECONDARY TO VIRAL MYOCARDITIS (H): ICD-10-CM

## 2021-03-11 ENCOUNTER — AMBULATORY - HEALTHEAST (OUTPATIENT)
Dept: NURSING | Facility: CLINIC | Age: 31
End: 2021-03-11

## 2021-04-01 ENCOUNTER — AMBULATORY - HEALTHEAST (OUTPATIENT)
Dept: NURSING | Facility: CLINIC | Age: 31
End: 2021-04-01

## 2021-04-30 ENCOUNTER — AMBULATORY - HEALTHEAST (OUTPATIENT)
Dept: SURGERY | Facility: HOSPITAL | Age: 31
End: 2021-04-30

## 2021-04-30 DIAGNOSIS — Z11.59 ENCOUNTER FOR SCREENING FOR OTHER VIRAL DISEASES: ICD-10-CM

## 2021-05-09 ENCOUNTER — AMBULATORY - HEALTHEAST (OUTPATIENT)
Dept: SURGERY | Facility: HOSPITAL | Age: 31
End: 2021-05-09

## 2021-05-09 DIAGNOSIS — Z11.59 ENCOUNTER FOR SCREENING FOR OTHER VIRAL DISEASES: ICD-10-CM

## 2021-05-11 ENCOUNTER — OFFICE VISIT - HEALTHEAST (OUTPATIENT)
Dept: CARDIOLOGY | Facility: CLINIC | Age: 31
End: 2021-05-11

## 2021-05-11 DIAGNOSIS — I50.22 CHRONIC SYSTOLIC CONGESTIVE HEART FAILURE (H): ICD-10-CM

## 2021-05-11 DIAGNOSIS — B33.22 DILATED CARDIOMYOPATHY SECONDARY TO VIRAL MYOCARDITIS (H): ICD-10-CM

## 2021-05-11 DIAGNOSIS — I43 DILATED CARDIOMYOPATHY SECONDARY TO VIRAL MYOCARDITIS (H): ICD-10-CM

## 2021-05-26 ENCOUNTER — COMMUNICATION - HEALTHEAST (OUTPATIENT)
Dept: CARDIOLOGY | Facility: CLINIC | Age: 31
End: 2021-05-26

## 2021-05-26 DIAGNOSIS — I43 DILATED CARDIOMYOPATHY SECONDARY TO VIRAL MYOCARDITIS (H): ICD-10-CM

## 2021-05-26 DIAGNOSIS — B33.22 DILATED CARDIOMYOPATHY SECONDARY TO VIRAL MYOCARDITIS (H): ICD-10-CM

## 2021-05-26 RX ORDER — LOSARTAN POTASSIUM 25 MG/1
TABLET ORAL
Qty: 45 TABLET | Refills: 1 | Status: SHIPPED | OUTPATIENT
Start: 2021-05-26 | End: 2021-11-16

## 2021-05-29 NOTE — PROGRESS NOTES
[facility] Progress Note    Assessment:    1.  Low cardiovascular risk for general anesthesia and surgery  2.  Mild nonischemic cardiomyopathy currently compensated    Plan:    Okay to proceed with surgery in the near future with no further preoperative cardiac testing required.  No special cardiac monitoring is needed in the perioperative.  Would continue the patient's current medical regimen    An After Visit Summary was printed and given to the patient.    Subjective:    29-year-old female who was diagnosed with a severe nonischemic cardiomyopathy in January 2015 with an apical mural thrombus noted at time of diagnosis.  Patient with medical therapy has had a significant improvement in her ejection fraction.  Her last echocardiogram was in August 2018 where a ejection fraction of 50% was obtained with no significant valvular abnormalities observed.  Over the last several months she rides a bicycle with no limiting dyspnea on exertion.  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 1 each 0     carvedilol (COREG) 25 MG tablet Take 1 tablet (25 mg total) by mouth 2 (two) times a day with meals. 180 tablet 2     digoxin (LANOXIN) 125 mcg tablet TAKE 1 TABLET (125 MCG TOTAL) BY MOUTH DAILY. 90 tablet 2     furosemide (LASIX) 20 MG tablet TAKE 1 TABLET BY MOUTH DAILY, TAKE AN EXTRA TABLET EVERY OTHER  tablet 1     losartan (COZAAR) 25 MG tablet TAKE 0.5 TABLETS (12.5 MG TOTAL) BY MOUTH DAILY. 45 tablet 2     No current facility-administered medications for this visit.        .No past surgical history on  "file.    .  Social History     Socioeconomic History     Marital status: Single     Spouse name: Not on file     Number of children: Not on file     Years of education: Not on file     Highest education level: Not on file   Occupational History     Not on file   Social Needs     Financial resource strain: Not on file     Food insecurity:     Worry: Not on file     Inability: Not on file     Transportation needs:     Medical: Not on file     Non-medical: Not on file   Tobacco Use     Smoking status: Never Smoker     Smokeless tobacco: Never Used   Substance and Sexual Activity     Alcohol use: Not on file     Drug use: Not on file     Sexual activity: Not on file   Lifestyle     Physical activity:     Days per week: Not on file     Minutes per session: Not on file     Stress: Not on file   Relationships     Social connections:     Talks on phone: Not on file     Gets together: Not on file     Attends Taoism service: Not on file     Active member of club or organization: Not on file     Attends meetings of clubs or organizations: Not on file     Relationship status: Not on file     Intimate partner violence:     Fear of current or ex partner: Not on file     Emotionally abused: Not on file     Physically abused: Not on file     Forced sexual activity: Not on file   Other Topics Concern     Not on file   Social History Narrative     Not on file       .No family history on file.  Review of Systems:  General: WNL  Eyes: WNL  Ears/Nose/Throat: WNL  Lungs: WNL  Heart: WNL  Stomach: WNL  Bladder: WNL  Muscle/Joints: WNL  Skin: WNL  Nervous System: WNL  Mental Health: WNL     Blood: WNL    Objective:     /72 (Patient Site: Right Arm, Patient Position: Sitting, Cuff Size: Adult Regular)   Pulse 68   Resp 16   Ht 4' 9.5\" (1.461 m)   Wt 106 lb (48.1 kg)   BMI 22.54 kg/m    106 lb (48.1 kg)   [unfilled]    Physical Exam:    GENERAL APPEARANCE: alert, no apparent distress  HEENT: no scleral icterus or " xanthelasma  NECK: jugular venous pressure normal  CHEST: symmetric, the lungs were clear to auscultation  CARDIOVASCULAR: regular rhythm without murmur, click, or gallop; no carotid bruits  ABDOMEN: nondistended, nontender, bowel sounds present  EXTREMITIES: no cyanosis, clubbing or edema.    Cardiac Testing:    echocardiogram from August 21, 2018 results reviewed as above      Lab Results:    LIPIDS:  No results found for: CHOL  No results found for: HDL  No results found for: LDLCALC  No results found for: TRIG  No components found for: CHOLHDL    BMP:  Lab Results   Component Value Date    CREATININE 0.58 (L) 02/27/2018    BUN 7 (L) 02/27/2018     02/27/2018    K 3.6 02/27/2018     02/27/2018    CO2 25 02/27/2018         Lg Roberts MD,F.A.C.[facility]  [phone]

## 2021-05-30 VITALS — WEIGHT: 115 LBS | BODY MASS INDEX: 24.81 KG/M2 | HEIGHT: 57 IN

## 2021-05-30 NOTE — ANESTHESIA PREPROCEDURE EVALUATION
Anesthesia Evaluation      Patient summary reviewed     Airway   Mallampati: III  Neck ROM: full   Pulmonary - negative ROS    breath sounds clear to auscultation                         Cardiovascular   Exercise tolerance: > or = 4 METS  (+) cardiomyopathy (viral),     ECG reviewed  Rhythm: regular        Neuro/Psych      Comments: Mental delay  CP    Endo/Other    (-) not pregnant     Comments: PE 2015    GI/Hepatic/Renal - negative ROS      Other findings:     NPO > 8 hrs     Results for CAILIN CORONA (MRN [id number]) as of 6/25/2019 6/25/2019 09:35  Pregnancy Test, Urine: Negative      ECH0 8/2018:    Summary       Left ventricle ejection fraction is mildly decreased. The calculated left ventricular ejection fraction is 50%.    Normal left ventricular size.    Normal right ventricular size and systolic function.    No hemodynamically significant valvular heart abnormalities.    When compared to the previous study dated 7/18/2017, LVEF is mildly higher          Dental - normal exam                        Anesthesia Plan  Planned anesthetic: MAC      Toradol if ok with surgeon    ASA 3     Anesthetic plan and risks discussed with: patient and parent/guardian  Anesthesia plan special considerations: antiemetics,   Post-op plan: routine recovery

## 2021-05-30 NOTE — ANESTHESIA CARE TRANSFER NOTE
Last vitals:   Vitals:    06/25/19 1230   BP: 148/82   Pulse: 78   Resp: 16   Temp:    SpO2: 100%     Patient's level of consciousness is drowsy  Spontaneous respirations: yes  Maintains airway independently: yes  Dentition unchanged: yes  Oropharynx: oropharynx clear of all foreign objects    QCDR Measures:  ASA# 20 - Surgical Safety Checklist: WHO surgical safety checklist completed prior to induction    PQRS# 430 - Adult PONV Prevention: 4558F - Pt received => 2 anti-emetic agents (different classes) preop & intraop  ASA# 8 - Peds PONV Prevention: NA - Not pediatric patient, not GA or 2 or more risk factors NOT present  PQRS# 424 - Rashmi-op Temp Management: 4559F - At least one body temp DOCUMENTED => 35.5C or 95.9F within required timeframe  PQRS# 426 - PACU Transfer Protocol: - Transfer of care checklist used  ASA# 14 - Acute Post-op Pain: ASA14B - Patient did NOT experience pain >= 7 out of 10

## 2021-05-30 NOTE — ANESTHESIA POSTPROCEDURE EVALUATION
Patient: Kristyn Whatley  PAPANICOLAOU SMEAR, HYSTEROSCOPY, WITH DILATION AND CURETTAGE OF UTERUS  Anesthesia type: MAC    Patient location: Phase II Recovery  Last vitals:   Vitals Value Taken Time   /81 6/25/2019 12:45 PM   Temp 36.7  C (98  F) 6/25/2019 12:25 PM   Pulse 77 6/25/2019 12:55 PM   Resp 16 6/25/2019 12:45 PM   SpO2 100 % 6/25/2019 12:55 PM   Vitals shown include unvalidated device data.  Post vital signs: stable  Level of consciousness: awake and responds to simple questions  Post-anesthesia pain: pain controlled  Post-anesthesia nausea and vomiting: no  Pulmonary: unassisted, return to baseline  Cardiovascular: stable and blood pressure at baseline  Hydration: adequate  Anesthetic events: no    QCDR Measures:  ASA# 11 - Rashmi-op Cardiac Arrest: ASA11B - Patient did NOT experience unanticipated cardiac arrest  ASA# 12 - Rashmi-op Mortality Rate: ASA12B - Patient did NOT die  ASA# 13 - PACU Re-Intubation Rate: NA - No ETT / LMA used for case  ASA# 10 - Composite Anes Safety: ASA10A - No serious adverse event    Additional Notes:

## 2021-05-31 VITALS — BODY MASS INDEX: 23.95 KG/M2 | WEIGHT: 111 LBS | HEIGHT: 57 IN

## 2021-05-31 VITALS — WEIGHT: 111 LBS | BODY MASS INDEX: 23.95 KG/M2 | HEIGHT: 57 IN

## 2021-05-31 VITALS — WEIGHT: 105 LBS | HEIGHT: 58 IN | BODY MASS INDEX: 22.04 KG/M2

## 2021-06-01 VITALS — HEIGHT: 58 IN | WEIGHT: 104 LBS | BODY MASS INDEX: 21.83 KG/M2

## 2021-06-01 VITALS — HEIGHT: 58 IN | BODY MASS INDEX: 21.83 KG/M2 | WEIGHT: 104 LBS

## 2021-06-01 NOTE — PROGRESS NOTES
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 female who comes in today for follow-up visit.  Mother reports that patient has gained some weight lately.  She also started to take frequent naps.  Patient denies chest pain or shortness of breath.    Review of Systems:   General: Weight Gain  Eyes: WNL  Ears/Nose/Throat: WNL  Lungs: WNL  Heart: WNL  Stomach: WNL  Bladder: WNL  Muscle/Joints: WNL  Skin: WNL  Nervous System: WNL  Mental Health: WNL     Blood: WNL    Objective:   /70 (Patient Site: Left Arm, Patient Position: Sitting, Cuff Size: Adult Regular)   Pulse 68   Resp 16   Ht 4' 8\" (1.422 m)   Wt 108 lb (49 kg)   BMI 24.21 kg/m    Physical Exam:  GENERAL: no distress  NECK: No JVD  LUNGS: Clear to auscultation.  CARDIAC: regular rhythm, S1 & S2 normal.  No heaves, thrills, gallops or murmurs.  ABDOMEN: flat, negative hepatosplenomegaly, soft and non-tender.  EXTREMITIES: No evidence of cyanosis, clubbing or edema.    Current Outpatient Medications   Medication Sig Dispense Refill     blood pressure test kit-medium Kit Use 1 kit As Directed 3 (three) times a week. 1 each 0     carvedilol (COREG) 25 MG tablet Take 1 tablet (25 mg total) by mouth 2 (two) times a day with meals. 180 tablet 2     digoxin (LANOXIN) 125 mcg tablet TAKE 1 TABLET (125 MCG TOTAL) BY MOUTH DAILY. (Patient taking differently: Take 125 mcg by mouth every evening.       ) 90 tablet 2     furosemide (LASIX) 20 MG tablet Take 20 mg by mouth daily as needed (for swelling).       furosemide (LASIX) 20 MG tablet TAKE 1 TABLET BY MOUTH DAILY, TAKE AN EXTRA TABLET EVERY OTHER  tablet 2     losartan " (COZAAR) 25 MG tablet TAKE 0.5 TABLETS (12.5 MG TOTAL) BY MOUTH DAILY. (Patient taking differently: Take 12.5 mg by mouth every evening.       ) 45 tablet 2     furosemide (LASIX) 20 MG tablet Take 20 mg by mouth daily.       ibuprofen (ADVIL,MOTRIN) 600 MG tablet Take 1 tablet (600 mg total) by mouth every 6 (six) hours as needed for pain. 30 tablet 0     No current facility-administered medications for this visit.        Cardiographics:    Cardiac MR August 2015   1. Mildly dilated left ventricle with normal left ventricular wall thickness.   There is moderate global hypokinesis. The quantitative left ventricular ejection   fraction is 42%.   2. In the current study, there is no evidence of delayed gadolinium enhancement   indicating myocardial scar.   3. Normal right ventricular size and function.   4. There is no obvious valvular disease.   5. There is no pericardial effusion.      Echocardiogram: August 2018    Left ventricle ejection fraction is mildly decreased. The calculated left ventricular ejection fraction is 50%.    Normal left ventricular size.    Normal right ventricular size and systolic function.    No hemodynamically significant valvular heart abnormalities.    When compared to the previous study dated 7/18/2017, LVEF is mildly higher.    Lab Results:       No results found for: CHOL  No results found for: HDL  No results found for: LDLCALC  No results found for: TRIG  BNP   Date Value Ref Range Status   07/10/2017 <10 0 - 64 pg/mL Final       Jalen (Domo)  MD Cory

## 2021-06-01 NOTE — PATIENT INSTRUCTIONS - HE
Kristyn Whatley,    It was a pleasure to see you today at the [facility].     My recommendations after this visit include:    Echo and blood work to check for fluid retention    DOMINGO Ray MD, FACC, [facility] Center[facility]IE

## 2021-06-02 VITALS — WEIGHT: 105 LBS | HEIGHT: 58 IN | BODY MASS INDEX: 22.04 KG/M2

## 2021-06-02 VITALS — WEIGHT: 106 LBS | BODY MASS INDEX: 22.25 KG/M2 | HEIGHT: 58 IN

## 2021-06-03 VITALS — WEIGHT: 108 LBS | HEIGHT: 56 IN | BODY MASS INDEX: 24.3 KG/M2

## 2021-06-03 VITALS
HEIGHT: 56 IN | BODY MASS INDEX: 24.3 KG/M2 | HEART RATE: 68 BPM | WEIGHT: 108 LBS | RESPIRATION RATE: 16 BRPM | DIASTOLIC BLOOD PRESSURE: 70 MMHG | SYSTOLIC BLOOD PRESSURE: 110 MMHG

## 2021-06-03 VITALS — BODY MASS INDEX: 23.84 KG/M2 | HEIGHT: 56 IN | WEIGHT: 106 LBS

## 2021-06-04 VITALS — BODY MASS INDEX: 25.46 KG/M2 | WEIGHT: 110 LBS | HEIGHT: 55 IN

## 2021-06-05 NOTE — TELEPHONE ENCOUNTER
----- Message from Nayana Arevalo sent at 1/22/2020  2:16 PM CST -----  General phone call:    Caller: Babita Muniz  Primary cardiologist: Dr Ray  Detailed reason for call: Mom says patient has been running a low grad fever since 12/17/19 101.9 or 99.8 it depends around 4 PM till 8 PM.  Extremely tired takes naps after work for about 3 hours.    New or active symptoms? Low grade and extremely tired   Best phone number: [phone] babita Mathur   Best time to contact: Anytime  Ok to leave a detailed message? Yes  Device? no    Additional Info: Pt seen pcp last Friday.  Had lab work done today at 's outpatient lab.         Attempted to call pt's mother to discuss. LM asking her to call back to discuss. -kcl    Cristy calls to discuss her daughter who has been having an on/off low grade fever. She asks if a low-grade fever could be related to heart issues. Informed Cristy that this isn't a typical symptoms we see with heart issues. Reassured her that most recent echocardiogram showed that her heart's pumping action returned to normal. Encouraged close communication with PCP to discuss concerns. Confirmed FU plans with [facility] 4/2020. Advised to call back sooner if pt develops any heart symptoms such as shortness of breath, weight gain, edema, palpitations, coughing, or chest pain.  -kcl

## 2021-06-07 NOTE — PATIENT INSTRUCTIONS - HE
Kristyn Whatley,    It was a pleasure to see you today at the [facility].     My recommendations after this visit include:    Stop digoxin    DOMINGO Ray MD, FACC, VEE

## 2021-06-07 NOTE — PROGRESS NOTES
Review of Systems - History obtained from mother  General ROS: positive for  - fever  Psychological ROS: negative  Ophthalmic ROS: negative  ENT ROS: negative  Allergy and Immunology ROS: negative  Hematological and Lymphatic ROS: negative  Endocrine ROS: negative  Respiratory ROS: negative  Cardiovascular ROS: negative  Gastrointestinal ROS: positive for - diarrhea  Genito-Urinary ROS: negative  Musculoskeletal ROS: negative  Neurological ROS: negative  Dermatological ROS: negative

## 2021-06-08 NOTE — PROGRESS NOTES
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 female who comes in today for follow-up visit.  She and her mother report no new cardiac symptoms.  She has not had any weight gain.  She denies PND and orthopnea.  She likes to take a nap in the middle of the day.    Review of Systems:   General: WNL  Eyes: WNL  Ears/Nose/Throat: WNL  Lungs: WNL  Heart: WNL  Stomach: WNL  Bladder: WNL  Muscle/Joints: WNL  Skin: WNL  Nervous System: WNL  Mental Health: WNL     Blood: WNL    Objective:   Visit Vitals     /72 (Patient Site: Right Arm, Patient Position: Sitting, Cuff Size: Adult Regular)     Pulse 60     Resp 18     Ht 4' 9\" (1.448 m)     Wt 115 lb (52.2 kg)     BMI 24.89 kg/m2     Physical Exam:  GENERAL: no distress  NECK: No JVD  LUNGS: Clear to auscultation.  CARDIAC: regular rhythm, S1 & S2 normal.  No heaves, thrills, gallops or murmurs.  ABDOMEN: flat, negative hepatosplenomegaly, soft and non-tender.  EXTREMITIES: No evidence of cyanosis, clubbing or edema.    Current Outpatient Prescriptions   Medication Sig Dispense Refill     blood pressure test kit-medium Kit Use 1 kit As Directed 3 (three) times a week. 1 each 0     carvedilol (COREG) 25 MG tablet Take 1 tablet (25 mg total) by mouth 2 (two) times a day with meals. 180 tablet 4     digoxin (LANOXIN) 125 mcg tablet Take 1 tablet (125 mcg total) by mouth daily. 90 tablet 4     eplerenone (INSPRA) 25 MG tablet Take 1 tablet (25 mg total) by mouth daily. 90 tablet 2     furosemide (LASIX) 20 MG tablet Take 20 mg daily, take an extra 20 mg every other day. (Patient taking differently: Take 20 mg daily, take an extra 20 mg every other day only if excess weight gain) 135 tablet 1     losartan (COZAAR) " 25 MG tablet Take 0.5 tablets (12.5 mg total) by mouth daily. 45 tablet 2     No current facility-administered medications for this visit.        Cardiographics:    Cardiac MR August 2015   1. Mildly dilated left ventricle with normal left ventricular wall thickness.   There is moderate global hypokinesis. The quantitative left ventricular ejection   fraction is 42%.   2. In the current study, there is no evidence of delayed gadolinium enhancement   indicating myocardial scar.   3. Normal right ventricular size and function.   4. There is no obvious valvular disease.   5. There is no pericardial effusion.     Echo: August 2016 Normal left ventricular size.  Heavy trabeculation of the distal portion of the left ventricle.  Left ventricular ejection fraction is visually estimated to be 45%.  No regional wall motion abnormalities.  Mild mitral regurgitation.  Mild tricuspid regurgitation.  Doppler findings do not suggest pulmonary hypertension.      Compared to 2/26/2016, there is mild improvement in LV function    Lab Results:   BNP   Date Value Ref Range Status   08/16/2016 12 0 - 64 pg/mL Final       Jalen (Susan Ray MD

## 2021-06-09 ENCOUNTER — RECORDS - HEALTHEAST (OUTPATIENT)
Dept: ADMINISTRATIVE | Facility: OTHER | Age: 31
End: 2021-06-09

## 2021-06-11 ENCOUNTER — COMMUNICATION - HEALTHEAST (OUTPATIENT)
Dept: CARDIOLOGY | Facility: CLINIC | Age: 31
End: 2021-06-11

## 2021-06-11 ENCOUNTER — RECORDS - HEALTHEAST (OUTPATIENT)
Dept: ADMINISTRATIVE | Facility: OTHER | Age: 31
End: 2021-06-11

## 2021-06-11 DIAGNOSIS — B33.22 DILATED CARDIOMYOPATHY SECONDARY TO VIRAL MYOCARDITIS (H): ICD-10-CM

## 2021-06-11 DIAGNOSIS — I43 DILATED CARDIOMYOPATHY SECONDARY TO VIRAL MYOCARDITIS (H): ICD-10-CM

## 2021-06-11 RX ORDER — CARVEDILOL 25 MG/1
TABLET ORAL
Qty: 180 TABLET | Refills: 1 | Status: SHIPPED | OUTPATIENT
Start: 2021-06-11 | End: 2021-11-30

## 2021-06-11 NOTE — PROGRESS NOTES
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 female who comes in today for follow-up visit.  She has done well.  She denies chest pain or shortness of breath.  She has [place] [employer].  Her weight has been stable.  She has not had syncope.    Review of Systems:   General: WNL  Eyes: WNL  Ears/Nose/Throat: WNL  Lungs: WNL  Heart: WNL  Stomach: WNL  Bladder: WNL  Muscle/Joints: WNL  Skin: WNL  Nervous System: WNL  Mental Health: WNL     Blood: WNL    Objective:   /72 (Patient Site: Left Arm, Patient Position: Sitting, Cuff Size: Adult Regular)  Pulse 64  Resp 16  Ht 4' 8.5\" (1.435 m)  Wt 111 lb (50.3 kg)  BMI 24.45 kg/m2  Physical Exam:  GENERAL: no distress  NECK: No JVD  LUNGS: Clear to auscultation.  CARDIAC: regular rhythm, S1 & S2 normal.  No heaves, thrills, gallops or murmurs.  ABDOMEN: flat, negative hepatosplenomegaly, soft and non-tender.  EXTREMITIES: No evidence of cyanosis, clubbing or edema.    Current Outpatient Prescriptions   Medication Sig Dispense Refill     blood pressure test kit-medium Kit Use 1 kit As Directed 3 (three) times a week. 1 each 0     carvedilol (COREG) 25 MG tablet Take 1 tablet (25 mg total) by mouth 2 (two) times a day with meals. 180 tablet 2     digoxin (LANOXIN) 125 mcg tablet Take 1 tablet (125 mcg total) by mouth daily. 90 tablet 1     eplerenone (INSPRA) 25 MG tablet Take 1 tablet (25 mg total) by mouth daily. 90 tablet 2     furosemide (LASIX) 20 MG tablet Take 20 mg daily, take an extra 20 mg every other day. 180 tablet 1     losartan (COZAAR) 25 MG tablet Take 0.5 tablets (12.5 mg total) by mouth daily. 45 tablet 2     No " current facility-administered medications for this visit.        Cardiographics:    Cardiac MR August 2015   1. Mildly dilated left ventricle with normal left ventricular wall thickness.   There is moderate global hypokinesis. The quantitative left ventricular ejection   fraction is 42%.   2. In the current study, there is no evidence of delayed gadolinium enhancement   indicating myocardial scar.   3. Normal right ventricular size and function.   4. There is no obvious valvular disease.   5. There is no pericardial effusion.      Echo: August 2016 Normal left ventricular size.  Heavy trabeculation of the distal portion of the left ventricle.  Left ventricular ejection fraction is visually estimated to be 45%.  No regional wall motion abnormalities.  Mild mitral regurgitation.  Mild tricuspid regurgitation.  Doppler findings do not suggest pulmonary hypertension.      Compared to 2/26/2016, there is mild improvement in LV function    Lab Results:         BNP   Date Value Ref Range Status   08/16/2016 12 0 - 64 pg/mL Final       Jalen (Domo)  MD Cory

## 2021-06-12 ENCOUNTER — AMBULATORY - HEALTHEAST (OUTPATIENT)
Dept: FAMILY MEDICINE | Facility: CLINIC | Age: 31
End: 2021-06-12

## 2021-06-12 DIAGNOSIS — Z11.59 ENCOUNTER FOR SCREENING FOR OTHER VIRAL DISEASES: ICD-10-CM

## 2021-06-12 NOTE — PATIENT INSTRUCTIONS - HE
Kristyn Whatley,    It was a pleasure to see you today at the [facility].     My recommendations after this visit include:    hemal Ray MD, FACC, VEE

## 2021-06-13 LAB
SARS-COV-2 PCR COMMENT: NORMAL
SARS-COV-2 RNA SPEC QL NAA+PROBE: NEGATIVE
SARS-COV-2 VIRUS SPECIMEN SOURCE: NORMAL

## 2021-06-14 ENCOUNTER — RECORDS - HEALTHEAST (OUTPATIENT)
Dept: ADMINISTRATIVE | Facility: OTHER | Age: 31
End: 2021-06-14

## 2021-06-14 ENCOUNTER — COMMUNICATION - HEALTHEAST (OUTPATIENT)
Dept: SCHEDULING | Facility: CLINIC | Age: 31
End: 2021-06-14

## 2021-06-15 ENCOUNTER — RECORDS - HEALTHEAST (OUTPATIENT)
Dept: ADMINISTRATIVE | Facility: OTHER | Age: 31
End: 2021-06-15

## 2021-06-15 ENCOUNTER — SURGERY - HEALTHEAST (OUTPATIENT)
Dept: SURGERY | Facility: HOSPITAL | Age: 31
End: 2021-06-15
Payer: MEDICARE

## 2021-06-15 ENCOUNTER — ANESTHESIA - HEALTHEAST (OUTPATIENT)
Dept: SURGERY | Facility: HOSPITAL | Age: 31
End: 2021-06-15

## 2021-06-15 ASSESSMENT — MIFFLIN-ST. JEOR: SCORE: 1082.37

## 2021-06-16 PROBLEM — N92.0 MENORRHAGIA: Status: ACTIVE | Noted: 2019-06-25

## 2021-06-16 PROBLEM — I50.22 CHRONIC SYSTOLIC CONGESTIVE HEART FAILURE (H): Status: ACTIVE | Noted: 2019-10-02

## 2021-06-16 NOTE — PROGRESS NOTES
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  We may be able to discontinue furosemide during follow-up visit.  I encouraged her to try probiotics to restore GI floor.  She should talk to her primary physician if diarrhea continues on.    Basic metabolic panel    Follow-up in 6 months    Subjective:   This is 28 y.o. female who comes in today for follow-up visit.  She has done well from the cardiac standpoint chest pain.  She is very active.  Her mother is concerned about protracted diarrhea.  She goes to bathroom after every meal.  She cannot tolerate dairy products.  Diarrhea began after upper respiratory infection and antibiotic course.    Review of Systems:   General: Weight Loss  Eyes: WNL  Ears/Nose/Throat: WNL  Lungs: WNL  Heart: WNL  Stomach: Diarrhea  Bladder: WNL  Muscle/Joints: WNL  Skin: WNL  Nervous System: WNL  Mental Health: WNL     Blood: WNL    Objective:   /76 (Patient Site: Right Arm, Patient Position: Sitting, Cuff Size: Adult Regular)  Pulse 76  Resp 12  Ht 4' 9.5\" (1.461 m) Comment: shoes on  Wt 105 lb (47.6 kg) Comment: shoes on  BMI 22.33 kg/m2  Physical Exam:  GENERAL: no distress  NECK: No JVD  LUNGS: Clear to auscultation.  CARDIAC: regular rhythm, S1 & S2 normal.  No heaves, thrills, gallops or murmurs.  ABDOMEN: flat, negative hepatosplenomegaly, soft and non-tender.  EXTREMITIES: No evidence of cyanosis, clubbing or edema.    Current Outpatient Prescriptions   Medication Sig Dispense Refill     blood pressure test kit-medium Kit Use 1 kit As Directed 3 (three) times a week. 1 each 0     carvedilol (COREG) 25 MG tablet Take 1 tablet (25 mg total) by mouth 2 (two) times a day with meals. 180 tablet 2     eplerenone (INSPRA) 25 MG tablet " TAKE 1 TABLET (25 MG TOTAL) BY MOUTH DAILY. 90 tablet 1     furosemide (LASIX) 20 MG tablet Take 20 mg daily, take an extra 20 mg every other day. 180 tablet 1     losartan (COZAAR) 25 MG tablet TAKE 1/2 TABLETS (12.5 MG TOTAL) BY MOUTH DAILY. 45 tablet 2     No current facility-administered medications for this visit.        Cardiographics:    Cardiac MR August 2015   1. Mildly dilated left ventricle with normal left ventricular wall thickness.   There is moderate global hypokinesis. The quantitative left ventricular ejection   fraction is 42%.   2. In the current study, there is no evidence of delayed gadolinium enhancement   indicating myocardial scar.   3. Normal right ventricular size and function.   4. There is no obvious valvular disease.   5. There is no pericardial effusion.       Echo: July 2017    Left ventricle ejection fraction is mildly decreased. The calculated left ventricular ejection fraction is 45%.    Prominent left ventricular trabeculations. Consider non-compaction    No significant valve abnormality.    When compared to the previous study dated 8/24/2016, findings appear grossly similar.    Lab Results:         BNP   Date Value Ref Range Status   07/10/2017 <10 0 - 64 pg/mL Final       Jalen (Susan Ray MD

## 2021-06-17 NOTE — PATIENT INSTRUCTIONS - HE
Kristyn Whatley,    It was a pleasure to see you today at the [facility].     My recommendations after this visit include:    Proceed with surgery  Hold furosemide on the day of surgery    DOMINGO Ray MD, FACC, [facility]

## 2021-06-19 NOTE — LETTER
Letter by Jalen Ray MD (Ted) at      Author: Jalen Ray MD (Ted) Service: -- Author Type: --    Filed:  Encounter Date: 7/25/2019 Status: (Other)         Kristyn Allrededt  [address] [facility] [address]      July 25, 2019      Dear Kristyn,    This letter is to remind you that you will be due for your follow up appointment with Dr. Domo Ray  . To help ensure you are in the best health possible, a regular follow-up with your cardiologist is essential.     Please call our Patient Scheduling Line at [phone] to schedule your appointment at your earliest convenience.  If you have recently scheduled an appointment, please disregard this letter.    We look forward to seeing you again. As always, we are available at the number  above for any questions or concerns you may have.      Sincerely,     The Physicians and Staff of [facility]

## 2021-06-19 NOTE — LETTER
"Letter by Lizbeth Love RN at      Author: Lizbeth Love RN Service: -- Author Type: --    Filed:  Encounter Date: 6/25/2019 Status: (Other)       [employer] [facility]  [facility]  [address]   [address]  [place] [address]      Orin Chacorta  [address] [place] [address]      6/25/2019    Dear Cristy and Anibal    We are writing on behalf of [facility] and [facility]. [employer] is the department which coordinates documentation of decision-making authority.  Care providers are required to have copies of legal documents when a patient is unable to make their own health care decisions and the court has appointed a guardian to make decisions on their behalf.     Our review indicates we have received the Order Appointing Co-Guardianship document dated 1-17-06 which names you as a guardian for Kristyn Whatley. However, in order to completely validate the guardianship and the zapata granted by the court we also need the following document(s):     Letters of Guardianship. This official document is signed by the  and issued and filed by the court. This document was issued by the court after you accepted the appointment as guardian(s) and indicates the guardianship order is \"complete\". We need this in addition to the Order because the Letters document will reflect any changes to appointed guardian(s) or authorities granted since the Order was issued.This document is usually a one-page document which will name you and list the zapata granted.  This document is referred to on your Order document in the \"Now, Therefore, It is Ordered\" section on page 3 of the order, number 3. We believe this document was signed by the  on 1-17-06.    As a guardian you will also receive other documents from the court. These documents can indicate a guardianship has been granted but does not provide us " with information on the zapata that were granted.  We do not need copies of these documents:       Petition for guardianship (usually signed by the  or persons asking for guardianship)    Guardianship Documentation Request- Page 2 of 2      Dear Guardians letter from the  outlining responsibilities as a guardian (not an officially filed document and doesnt state specifically which of the 7 possible zapata under state statute have been granted).     Acceptance of Appointment by Individuals    Annual Well-Being Report (usually signed by guardian)     Annual notice of the diggs's right to petition     Notice of hearing (may be signed by a  but is not the granting of guardianship)    Affidavit      The requested document(s) should have been mailed to you by the court. If you don't have the document(s) please request a copy from the court.     We ask that we receive  the requested document(s) at least one week prior to Kristyn's next scheduled appointment so we have time to review the document(s) and update the medical record.     The document(s) may be sent by email to :   [email or web address]   or by mailing to:  [employer] [facility]  [address] [address]    If you have any questions or need additional assistance please call us at: [phone].     Thank you.

## 2021-06-19 NOTE — LETTER
Letter by Jalen Ray MD (Ted) at      Author: Jalen Ray MD (Ted) Service: -- Author Type: --    Filed:  Encounter Date: 7/24/2019 Status: (Other)         Kristyn Allrededt  [address] [facility] [address]      July 24, 2019      Dear Kristyn,    This letter is to remind you that you will be due for your follow up appointment with Dr. Domo Ray . To help ensure you are in the best health possible, a regular follow-up with your cardiologist is essential.     Please call our Patient Scheduling Line at [phone] to schedule your appointment at your earliest convenience.  If you have recently scheduled an appointment, please disregard this letter.    We look forward to seeing you again. As always, we are available at the number  above for any questions or concerns you may have.      Sincerely,     The Physicians and Staff of [facility]

## 2021-06-19 NOTE — PROGRESS NOTES
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 female who comes in today for follow-up visit.  She reports occasional left-sided chest pain.  The pain gets worse after she coughs.  She denies increasing shortness of breath.  The mother reports that the patient has been napping a lot lately.  She has not had a weight gain.  She denies heart palpitations or syncope.    Review of Systems:   General: WNL  Eyes: WNL  Ears/Nose/Throat: WNL  Lungs: WNL  Heart: WNL  Stomach: WNL  Bladder: WNL  Muscle/Joints: WNL  Skin: WNL  Nervous System: WNL  Mental Health: WNL     Blood: WNL    Objective:   /72 (Patient Site: Right Arm, Patient Position: Sitting, Cuff Size: Adult Regular)  Pulse 76  Resp 16  Ht 4' 9.5\" (1.461 m)  Wt 104 lb (47.2 kg)  BMI 22.12 kg/m2  Physical Exam:  GENERAL: no distress  NECK: No JVD  LUNGS: Clear to auscultation.  CARDIAC: regular rhythm, S1 & S2 normal.  No heaves, thrills, gallops or murmurs.  ABDOMEN: flat, negative hepatosplenomegaly, soft and non-tender.  EXTREMITIES: No evidence of cyanosis, clubbing or edema.    Current Outpatient Prescriptions   Medication Sig Dispense Refill     blood pressure test kit-medium Kit Use 1 kit As Directed 3 (three) times a week. 1 each 0     carvedilol (COREG) 25 MG tablet TAKE 1 TABLET (25 MG TOTAL) BY MOUTH 2 (TWO) TIMES A DAY WITH MEALS. 180 tablet 2     digoxin (LANOXIN) 125 mcg tablet Take 1 tablet (125 mcg total) by mouth daily. 90 tablet 3     eplerenone (INSPRA) 25 MG tablet TAKE 1 TABLET (25 MG TOTAL) BY MOUTH DAILY. 90 tablet 1     furosemide (LASIX) 20 MG tablet Take 20 mg daily, take an " extra 20 mg every other day. (Patient taking differently: Take 20 mg daily, take an extra 20 mg if needed) 180 tablet 1     losartan (COZAAR) 25 MG tablet Take 0.5 tablets (12.5 mg total) by mouth daily. 45 tablet 2     No current facility-administered medications for this visit.        Cardiographics:    Cardiac MR August 2015   1. Mildly dilated left ventricle with normal left ventricular wall thickness.   There is moderate global hypokinesis. The quantitative left ventricular ejection   fraction is 42%.   2. In the current study, there is no evidence of delayed gadolinium enhancement   indicating myocardial scar.   3. Normal right ventricular size and function.   4. There is no obvious valvular disease.   5. There is no pericardial effusion.       Echo: July 2017    Left ventricle ejection fraction is mildly decreased. The calculated left ventricular ejection fraction is 45%.    Prominent left ventricular trabeculations. Consider non-compaction    No significant valve abnormality.    When compared to the previous study dated 8/24/2016, findings appear grossly similar.    Lab Results:       No results found for: CHOL  No results found for: HDL  No results found for: LDLCALC  No results found for: TRIG  BNP   Date Value Ref Range Status   07/10/2017 <10 0 - 64 pg/mL Final       Jalen (Domo)  MD Cory

## 2021-06-24 NOTE — PROGRESS NOTES
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 female who comes in today for follow-up visit.  She reports no new cardiac symptoms.  Pleuritic chest pain has resolved without any intervention.  She is more energetic.  She denies weight gain, PND, orthopnea.  She is compliant with medications    Review of Systems:   General: WNL  Eyes: WNL  Ears/Nose/Throat: WNL  Lungs: WNL  Heart: WNL  Stomach: WNL  Bladder: WNL  Muscle/Joints: WNL  Skin: WNL  Nervous System: WNL  Mental Health: WNL     Blood: WNL    Objective:   /70 (Patient Site: Right Arm, Patient Position: Sitting, Cuff Size: Adult Regular)   Pulse 68   Resp 16   Ht 4' 9.5\" (1.461 m)   Wt 105 lb (47.6 kg)   BMI 22.33 kg/m    Physical Exam:  GENERAL: no distress  NECK: No JVD  LUNGS: Clear to auscultation.  CARDIAC: regular rhythm, S1 & S2 normal.  No heaves, thrills, gallops or murmurs.  ABDOMEN: flat, negative hepatosplenomegaly, soft and non-tender.  EXTREMITIES: No evidence of cyanosis, clubbing or edema.    Current Outpatient Medications   Medication Sig Dispense Refill     blood pressure test kit-medium Kit Use 1 kit As Directed 3 (three) times a week. 1 each 0     carvedilol (COREG) 25 MG tablet TAKE 1 TABLET (25 MG TOTAL) BY MOUTH 2 (TWO) TIMES A DAY WITH MEALS. 180 tablet 2     digoxin (LANOXIN) 125 mcg tablet Take 1 tablet (125 mcg total) by mouth daily. 90 tablet 3     furosemide (LASIX) 20 MG tablet TAKE 1 TABLET BY MOUTH DAILY, TAKE AN EXTRA TABLET EVERY OTHER DAY. 180 tablet 1     losartan (COZAAR) 25 MG tablet Take 0.5 tablets (12.5 mg total) by mouth daily. 45 tablet 2     No current " facility-administered medications for this visit.        Cardiographics:    Cardiac MR August 2015   1. Mildly dilated left ventricle with normal left ventricular wall thickness.   There is moderate global hypokinesis. The quantitative left ventricular ejection   fraction is 42%.   2. In the current study, there is no evidence of delayed gadolinium enhancement   indicating myocardial scar.   3. Normal right ventricular size and function.   4. There is no obvious valvular disease.   5. There is no pericardial effusion.     Echocardiogram: August 2018    Left ventricle ejection fraction is mildly decreased. The calculated left ventricular ejection fraction is 50%.    Normal left ventricular size.    Normal right ventricular size and systolic function.    No hemodynamically significant valvular heart abnormalities.    When compared to the previous study dated 7/18/2017, LVEF is mildly higher.      Lab Results:         BNP   Date Value Ref Range Status   07/10/2017 <10 0 - 64 pg/mL Final       Jalen (Susan Ray MD

## 2021-06-24 NOTE — PATIENT INSTRUCTIONS - HE
Kristyn Whatley,    It was a pleasure to see you today at the [facility].     My recommendations after this visit include:    Same medications    DOMINGO Ray MD, FACC, VEE

## 2021-06-26 NOTE — ANESTHESIA CARE TRANSFER NOTE
Last vitals:   Vitals:    06/15/21 1810   BP: (P) 126/80   Pulse: (P) 74   Resp: (P) 18   Temp: (P) 37.7  C (99.8  F)   SpO2: (P) 100%     Patient's level of consciousness is drowsy  Spontaneous respirations: yes  Maintains airway independently: yes  Dentition unchanged: yes  Oropharynx: oropharynx clear of all foreign objects    QCDR Measures:  ASA# 20 - Surgical Safety Checklist: WHO surgical safety checklist completed prior to induction    PQRS# 430 - Adult PONV Prevention: 4558F - Pt received => 2 anti-emetic agents (different classes) preop & intraop  ASA# 8 - Peds PONV Prevention: NA - Not pediatric patient, not GA or 2 or more risk factors NOT present  PQRS# 424 - Rashmi-op Temp Management: 4559F - At least one body temp DOCUMENTED => 35.5C or 95.9F within required timeframe  PQRS# 426 - PACU Transfer Protocol: - Transfer of care checklist used  ASA# 14 - Acute Post-op Pain: ASA14B - Patient did NOT experience pain >= 7 out of 10

## 2021-06-26 NOTE — ANESTHESIA POSTPROCEDURE EVALUATION
Patient: Kristyn Whatley  Procedure(s):  ROBOTIC LAPAROSCOPIC HYSTERECTOMY BILATERAL SALPINGECTOMY (Bilateral)  CYSTOSCOPY  Anesthesia type: general    Patient location: PACU  Last vitals:   Vitals Value Taken Time   /88 06/15/21 1820   Temp 37.7  C (99.8  F) 06/15/21 1810   Pulse 72 06/15/21 1838   Resp 17 06/15/21 1838   SpO2 95 % 06/15/21 1838   Vitals shown include unvalidated device data.  Post vital signs: stable  Level of consciousness: awake and responds to simple questions  Post-anesthesia pain: pain controlled  Post-anesthesia nausea and vomiting: no  Pulmonary: unassisted, return to baseline  Cardiovascular: stable and blood pressure at baseline  Hydration: adequate  Anesthetic events: no    QCDR Measures:  ASA# 11 - Rashmi-op Cardiac Arrest: ASA11B - Patient did NOT experience unanticipated cardiac arrest  ASA# 12 - Rashmi-op Mortality Rate: ASA12B - Patient did NOT die  ASA# 13 - PACU Re-Intubation Rate: ASA13B - Patient did NOT require a new airway mgmt  ASA# 10 - Composite Anes Safety: ASA10A - No serious adverse event    Additional Notes:

## 2021-06-26 NOTE — ANESTHESIA PREPROCEDURE EVALUATION
Anesthesia Evaluation      Patient summary reviewed   History of anesthetic complications (slow wake up)     Airway   Mallampati: II  Neck ROM: full   Pulmonary - negative ROS and normal exam                          Cardiovascular - normal exam  (+) CHF (NICM, normal EF), ,      Neuro/Psych      Comments: Cerebral palsy    Endo/Other - negative ROS      GI/Hepatic/Renal - negative ROS           Dental - normal exam                        Anesthesia Plan  Planned anesthetic: general endotracheal    ASA 3   Induction: intravenous   Anesthetic plan and risks discussed with: patient    Post-op plan: routine recovery

## 2021-06-27 ENCOUNTER — HEALTH MAINTENANCE LETTER (OUTPATIENT)
Age: 31
End: 2021-06-27

## 2021-06-29 NOTE — PROGRESS NOTES
"Progress Notes by Jalen Ray MD (Ted) at 10/12/2020  3:30 PM     Author: Jalen Ray MD (Ted) Service: -- Author Type: Physician    Filed: 10/12/2020  4:03 PM Encounter Date: 10/12/2020 Status: Signed    : Jalen Ray MD (Ted) (Physician)           Cardiology Progress Note    Assessment:  Nonischemic dilated cardiomyopathy, normalization of LVEF LV systolic function, clinically no fluid overload  Cerebral palsy      Plan:  Echo to recheck LV systolic function after discontinuation of digoxin    Continue current medications    Follow-up in 6 months      Subjective:   This is 30 y.o. female who comes in today for follow-up visit.  She reports no new cardiac symptoms.  She continues to have low-grade fever.  She has not had weight gain, PND, orthopnea.  She denies heart palpitations or syncope.  We stopped digoxin after her last visit.  She did not notice any change to her overall wellbeing      Review of Systems:   General: Fever  Eyes: WNL  Ears/Nose/Throat: WNL  Lungs: WNL  Heart: WNL  Stomach: WNL  Bladder: WNL  Muscle/Joints: WNL  Skin: WNL  Nervous System: WNL  Mental Health: WNL     Blood: WNL    Objective:   /72 (Patient Site: Right Arm, Patient Position: Sitting, Cuff Size: Adult Small)   Pulse 72   Resp 16   Ht 4' 7.98\" (1.422 m)   Wt 110 lb 12.8 oz (50.3 kg)   BMI 24.86 kg/m    Physical Exam:  GENERAL: no distress  NECK: No JVD  LUNGS: Clear to auscultation.  CARDIAC: regular rhythm, S1 & S2 normal.  No heaves, thrills, gallops or murmurs.  ABDOMEN: flat, negative hepatosplenomegaly, soft and non-tender.  EXTREMITIES: No evidence of cyanosis, clubbing or edema.    Current Outpatient Medications   Medication Sig Dispense Refill   ? amoxicillin (AMOXIL) 500 MG capsule Take 500 mg by mouth 3 (three) times a day. Today is the last day 10-12-20     ? blood pressure test kit-medium Kit Use 1 kit As Directed 3 (three) times a week. 1 each 0   ? " carvediloL (COREG) 25 MG tablet Take 1 tablet (25 mg total) by mouth 2 (two) times a day with meals. 180 tablet 2   ? furosemide (LASIX) 20 MG tablet ALTERNATE TAKING 1 TABLET BY MOUTH DAILY AND 2 TABLETS BY MOUTH DAILY 135 tablet 1   ? ibuprofen (ADVIL,MOTRIN) 600 MG tablet Take 1 tablet (600 mg total) by mouth every 6 (six) hours as needed for pain. 30 tablet 0   ? losartan (COZAAR) 25 MG tablet 0.5 tablets (12.5 mg total) by G-tube route daily. 45 tablet 1     No current facility-administered medications for this visit.        Cardiographics:    Cardiac MR August 2015   1. Mildly dilated left ventricle with normal left ventricular wall thickness.   There is moderate global hypokinesis. The quantitative left ventricular ejection   fraction is 42%.   2. In the current study, there is no evidence of delayed gadolinium enhancement   indicating myocardial scar.   3. Normal right ventricular size and function.   4. There is no obvious valvular disease.   5. There is no pericardial effusion.      Echocardiogram: August 2018    Left ventricle ejection fraction is mildly decreased. The calculated left ventricular ejection fraction is 50%.    Normal left ventricular size.    Normal right ventricular size and systolic function.    No hemodynamically significant valvular heart abnormalities.    When compared to the previous study dated 7/18/2017, LVEF is mildly higher.    Lab Results:       No results found for: CHOL  No results found for: HDL  No results found for: LDLCALC  No results found for: TRIG  BNP   Date Value Ref Range Status   10/02/2019 <10 0 - 64 pg/mL Final       Jalen (Domo)  MD Cory

## 2021-06-30 NOTE — PROGRESS NOTES
Progress Notes by Jalen Ray MD (Ted) at 5/11/2021  2:30 PM     Author: Jalen Ray MD (Ted) Service: -- Author Type: Physician    Filed: 5/11/2021  3:10 PM Encounter Date: 5/11/2021 Status: Signed    : Jalen Ray MD (Ted) (Physician)           Cardiology Progress Note    Assessment:  Nonischemic dilated cardiomyopathy, normalization of LVEF LV systolic function, clinically no fluid overload, stable  Cerebral palsy      Plan:  Continue current cardiac medications    She is stable to undergo hysterectomy at low cardiac risk.  I advised her mother not to give her furosemide on the day of surgery.  Otherwise she should be taking all her medications throughout the perioperative period.    Routine follow-up in  6 months    Subjective:   This is 31 y.o. female who comes in today follow-up visit.  She has done well.  She has not had chest pain or shortness of breath.  She is physically active.  Her mother states that she been riding her bicycle for hours in her neighborhood.  She denies heart palpitations or syncope.  She has chronic vaginal bleeding.  She is scheduled to undergo laparoscopic hysterectomy in June of this year.    Review of Systems:   General: WNL  Eyes: WNL  Ears/Nose/Throat: WNL  Lungs: WNL  Heart: WNL  Stomach: WNL  Bladder: WNL  Muscle/Joints: WNL  Skin: WNL  Nervous System: WNL  Mental Health: WNL     Blood: WNL    Objective:   /64 (Patient Site: Right Arm, Patient Position: Sitting, Cuff Size: Adult Regular)   Pulse 73   Resp 16   Wt 112 lb (50.8 kg)   SpO2 99%   BMI 26.03 kg/m    Physical Exam:  GENERAL: no distress  NECK: No JVD  LUNGS: Clear to auscultation.  CARDIAC: regular rhythm, S1 & S2 normal.  No heaves, thrills, gallops or murmurs.  ABDOMEN: flat, negative hepatosplenomegaly, soft and non-tender.  EXTREMITIES: No evidence of cyanosis, clubbing or edema.    Current Outpatient Medications   Medication Sig Dispense Refill   ?  amoxicillin (AMOXIL) 500 MG capsule Take 500 mg by mouth as needed.      ? blood pressure test kit-medium Kit Use 1 kit As Directed 3 (three) times a week. 1 each 0   ? carvediloL (COREG) 25 MG tablet Take 1 tablet (25 mg total) by mouth 2 (two) times a day with meals. 180 tablet 2   ? furosemide (LASIX) 20 MG tablet ALTERNATE TAKING 1 TABLET BY MOUTH DAILY AND 2 TABLETS BY MOUTH DAILY 135 tablet 1   ? losartan (COZAAR) 25 MG tablet TAKE 1/2 TABLET BY G-TUBE ROUTE DAILY. 45 tablet 0   ? ibuprofen (ADVIL,MOTRIN) 600 MG tablet Take 1 tablet (600 mg total) by mouth every 6 (six) hours as needed for pain. 30 tablet 0     No current facility-administered medications for this visit.        Cardiographics:    Cardiac MR August 2015   1. Mildly dilated left ventricle with normal left ventricular wall thickness.   There is moderate global hypokinesis. The quantitative left ventricular ejection   fraction is 42%.   2. In the current study, there is no evidence of delayed gadolinium enhancement   indicating myocardial scar.   3. Normal right ventricular size and function.   4. There is no obvious valvular disease.   5. There is no pericardial effusion.      Echocardiogram: November 2020    When compared to the previous study dated 10/11/2019, no significant change.    Left ventricle ejection fraction is normal. The calculated left ventricular ejection fraction is 58%.    Normal left ventricular size and systolic function.    Normal right ventricular size and systolic function.    No hemodynamically significant valvular heart abnormalities.        Lab Results:       No results found for: CHOL  No results found for: HDL  No results found for: LDLCALC  No results found for: TRIG  BNP   Date Value Ref Range Status   10/02/2019 <10 0 - 64 pg/mL Final       Jalen (Susan Ray MD

## 2021-07-05 PROBLEM — I50.22 CHRONIC SYSTOLIC CONGESTIVE HEART FAILURE (H): Status: RESOLVED | Noted: 2019-10-02 | Resolved: 2021-06-16

## 2021-07-06 VITALS
WEIGHT: 111 LBS | HEIGHT: 57 IN | BODY MASS INDEX: 24.24 KG/M2 | WEIGHT: 111 LBS | BODY MASS INDEX: 25.8 KG/M2 | BODY MASS INDEX: 24.02 KG/M2

## 2021-07-14 PROBLEM — I50.22 CHRONIC SYSTOLIC CONGESTIVE HEART FAILURE (H): Status: RESOLVED | Noted: 2019-10-02 | Resolved: 2021-06-16

## 2021-10-17 ENCOUNTER — HEALTH MAINTENANCE LETTER (OUTPATIENT)
Age: 31
End: 2021-10-17

## 2021-11-16 DIAGNOSIS — I43 DILATED CARDIOMYOPATHY SECONDARY TO VIRAL MYOCARDITIS (H): ICD-10-CM

## 2021-11-16 DIAGNOSIS — B33.22 DILATED CARDIOMYOPATHY SECONDARY TO VIRAL MYOCARDITIS (H): ICD-10-CM

## 2021-11-16 RX ORDER — LOSARTAN POTASSIUM 25 MG/1
TABLET ORAL
Qty: 45 TABLET | Refills: 1 | Status: SHIPPED | OUTPATIENT
Start: 2021-11-16 | End: 2022-05-16

## 2021-11-30 DIAGNOSIS — B33.22 DILATED CARDIOMYOPATHY SECONDARY TO VIRAL MYOCARDITIS (H): ICD-10-CM

## 2021-11-30 DIAGNOSIS — I43 DILATED CARDIOMYOPATHY SECONDARY TO VIRAL MYOCARDITIS (H): ICD-10-CM

## 2021-11-30 RX ORDER — CARVEDILOL 25 MG/1
25 TABLET ORAL 2 TIMES DAILY WITH MEALS
Qty: 180 TABLET | Refills: 0 | Status: SHIPPED | OUTPATIENT
Start: 2021-11-30 | End: 2022-02-21

## 2021-12-08 ENCOUNTER — OFFICE VISIT (OUTPATIENT)
Dept: CARDIOLOGY | Facility: CLINIC | Age: 31
End: 2021-12-08
Payer: MEDICARE

## 2021-12-08 VITALS
DIASTOLIC BLOOD PRESSURE: 62 MMHG | HEART RATE: 60 BPM | RESPIRATION RATE: 16 BRPM | BODY MASS INDEX: 24.58 KG/M2 | SYSTOLIC BLOOD PRESSURE: 112 MMHG | WEIGHT: 113.6 LBS

## 2021-12-08 DIAGNOSIS — I42.0 DILATED CARDIOMYOPATHY (H): ICD-10-CM

## 2021-12-08 PROBLEM — I50.22 CHRONIC SYSTOLIC HEART FAILURE (H): Status: ACTIVE | Noted: 2019-10-02

## 2021-12-08 PROCEDURE — 99214 OFFICE O/P EST MOD 30 MIN: CPT | Performed by: INTERNAL MEDICINE

## 2021-12-08 NOTE — PATIENT INSTRUCTIONS
Kristyn Whatley,    It was a pleasure to see you today at the [facility].     My recommendations after this visit include:    Same medications    DOMINGO Ray MD, FACC, VEE

## 2021-12-08 NOTE — PROGRESS NOTES
Cardiology Progress Note     Assessment:  Nonischemic dilated cardiomyopathy, normalization of LVEF LV systolic function, clinically no fluid overload, stable  Cerebral palsy  Raynaud phenomenon, mild    Plan:  She appears to be well compensated.  No medication changes    I reassured her that coldness of the fingers and related to heart condition.  She has good radial pulses bilaterally.  Carvedilol can be contributing to it but I think is important for the patient to stay on the medications to prevent redevelopment of cardiomyopathy.    Follow-up in 6 to 12 months    Subjective:   This is 31 year old female who comes in today for follow-up visit.  She underwent hysterectomy without any cardiac complications.  Her weight has been stable.  She has no PND no orthopnea.  Her mother is concerned about coldness of daughters fingertips.  She denies any discoloration of the fingers.  She tolerates all medication well    Review of Systems:   Negative other than history of present illness    Objective:   /62 (BP Location: Left arm, Patient Position: Sitting, Cuff Size: Adult Regular)   Pulse 60   Resp 16   Wt 51.5 kg (113 lb 9.6 oz)   BMI 24.58 kg/m    Physical Exam:  GENERAL: no distress  NECK: No JVD  LUNGS: Clear to auscultation.  CARDIAC: regular rhythm, S1 & S2 normal.  No heaves, thrills, gallops or murmurs.  ABDOMEN: flat, negative hepatosplenomegaly, soft and non-tender.  EXTREMITIES: No evidence of cyanosis, clubbing or edema.    Current Outpatient Medications   Medication Sig Dispense Refill     carvedilol (COREG) 25 MG tablet Take 1 tablet (25 mg) by mouth 2 times daily (with meals) 180 tablet 0     furosemide (LASIX) 20 MG tablet [FUROSEMIDE (LASIX) 20 MG TABLET] Take 20 mg by mouth daily.       loratadine (CLARITIN) 10 mg tablet [LORATADINE (CLARITIN) 10 MG TABLET] Take 10 mg by mouth daily as needed for allergies.       losartan (COZAAR) 25 MG tablet [LOSARTAN (COZAAR) 25 MG TABLET] TAKE 1/2  TABLET BY G-TUBE ROUTE DAILY. 45 tablet 1     blood pressure test kit-medium Kit [BLOOD PRESSURE TEST KIT-MEDIUM KIT] Use 1 kit As Directed 3 (three) times a week. 1 each 0     furosemide (LASIX) 20 MG tablet [FUROSEMIDE (LASIX) 20 MG TABLET] Take 20 mg by mouth daily as needed (for puffiness). Take 6 hours after AM dose if needed (Patient not taking: Reported on 12/8/2021)         Cardiographics:    Cardiac MR August 2015   1. Mildly dilated left ventricle with normal left ventricular wall thickness.   There is moderate global hypokinesis. The quantitative left ventricular ejection   fraction is 42%.   2. In the current study, there is no evidence of delayed gadolinium enhancement   indicating myocardial scar.   3. Normal right ventricular size and function.   4. There is no obvious valvular disease.   5. There is no pericardial effusion.      Echocardiogram: November 2020    When compared to the previous study dated 10/11/2019, no significant change.    Left ventricle ejection fraction is normal. The calculated left ventricular ejection fraction is 58%.    Normal left ventricular size and systolic function.    Normal right ventricular size and systolic function.    No hemodynamically significant valvular heart abnormalities.       Lab Results    Chemistry/lipid CBC Cardiac Enzymes/BNP/TSH/INR   No results for input(s): CHOL, HDL, LDL, TRIG, CHOLHDLRATIO in the last 96520 hours.  No results for input(s): LDL in the last 97063 hours.  Recent Labs   Lab Test 06/15/21  1900 01/17/20  1148   NA  --  139   POTASSIUM  --  4.3   CHLORIDE  --  103   CO2  --  21*   GLC  --  85   BUN  --  6*   CR 0.61 0.52*   GFRESTIMATED >60 >60   FAB  --  9.2     Recent Labs   Lab Test 06/15/21  1900 01/17/20  1148 02/27/18  1532   CR 0.61 0.52* 0.58*     No results for input(s): A1C in the last 44638 hours.       Recent Labs   Lab Test 06/15/21  1900 01/22/20  1413   WBC  --  6.5   HGB  --  13.3   HCT  --  41.0   MCV  --  90    333      Recent Labs   Lab Test 01/22/20  1413   HGB 13.3    No results for input(s): TROPONINI in the last 83244 hours.  Recent Labs   Lab Test 10/02/19  1551   BNP <10     No results for input(s): TSH in the last 48200 hours.  No results for input(s): INR in the last 68282 hours.

## 2021-12-12 ENCOUNTER — HEALTH MAINTENANCE LETTER (OUTPATIENT)
Age: 31
End: 2021-12-12

## 2022-01-12 VITALS — HEIGHT: 57 IN | BODY MASS INDEX: 23.95 KG/M2 | WEIGHT: 111 LBS

## 2022-01-18 VITALS
HEART RATE: 73 BPM | DIASTOLIC BLOOD PRESSURE: 64 MMHG | SYSTOLIC BLOOD PRESSURE: 110 MMHG | OXYGEN SATURATION: 99 % | WEIGHT: 112 LBS | RESPIRATION RATE: 16 BRPM | BODY MASS INDEX: 26.03 KG/M2

## 2022-01-18 VITALS
BODY MASS INDEX: 24.93 KG/M2 | SYSTOLIC BLOOD PRESSURE: 122 MMHG | HEART RATE: 72 BPM | DIASTOLIC BLOOD PRESSURE: 72 MMHG | HEIGHT: 56 IN | WEIGHT: 110.8 LBS | RESPIRATION RATE: 16 BRPM

## 2022-01-18 VITALS — WEIGHT: 104.4 LBS | BODY MASS INDEX: 23.41 KG/M2

## 2022-02-11 ENCOUNTER — LAB REQUISITION (OUTPATIENT)
Dept: LAB | Facility: CLINIC | Age: 32
End: 2022-02-11
Payer: MEDICARE

## 2022-02-11 ENCOUNTER — TRANSFERRED RECORDS (OUTPATIENT)
Dept: HEALTH INFORMATION MANAGEMENT | Facility: CLINIC | Age: 32
End: 2022-02-11

## 2022-02-11 DIAGNOSIS — R50.9 FEVER, UNSPECIFIED: ICD-10-CM

## 2022-02-11 LAB — TSH SERPL DL<=0.005 MIU/L-ACNC: 1.23 UIU/ML (ref 0.3–5)

## 2022-02-11 PROCEDURE — 84443 ASSAY THYROID STIM HORMONE: CPT | Mod: ORL | Performed by: STUDENT IN AN ORGANIZED HEALTH CARE EDUCATION/TRAINING PROGRAM

## 2022-02-11 PROCEDURE — U0003 INFECTIOUS AGENT DETECTION BY NUCLEIC ACID (DNA OR RNA); SEVERE ACUTE RESPIRATORY SYNDROME CORONAVIRUS 2 (SARS-COV-2) (CORONAVIRUS DISEASE [COVID-19]), AMPLIFIED PROBE TECHNIQUE, MAKING USE OF HIGH THROUGHPUT TECHNOLOGIES AS DESCRIBED BY CMS-2020-01-R: HCPCS | Mod: ORL | Performed by: STUDENT IN AN ORGANIZED HEALTH CARE EDUCATION/TRAINING PROGRAM

## 2022-02-12 LAB — SARS-COV-2 RNA RESP QL NAA+PROBE: NEGATIVE

## 2022-05-18 ENCOUNTER — TRANSFERRED RECORDS (OUTPATIENT)
Dept: HEALTH INFORMATION MANAGEMENT | Facility: CLINIC | Age: 32
End: 2022-05-18

## 2022-05-31 ENCOUNTER — TELEPHONE (OUTPATIENT)
Dept: CARDIOLOGY | Facility: CLINIC | Age: 32
End: 2022-05-31
Payer: MEDICARE

## 2022-05-31 DIAGNOSIS — I50.22 CHRONIC SYSTOLIC CONGESTIVE HEART FAILURE (H): Primary | ICD-10-CM

## 2022-05-31 RX ORDER — FUROSEMIDE 20 MG
20 TABLET ORAL DAILY
Qty: 90 TABLET | Refills: 3 | Status: SHIPPED | OUTPATIENT
Start: 2022-05-31 | End: 2022-09-16

## 2022-05-31 NOTE — TELEPHONE ENCOUNTER
Health Call Center    Phone Message    May a detailed message be left on voicemail: yes     Reason for Call: Other: Pt mom would like a call back as the pharmacy will not refill her furosemide as they said she needs a F/U appt but her notes said 6 to 12 months before next visit. Please reach out to pt mom Kareen to discuss     Action Taken: Message routed to:  [facility] ([facility]): Cardio    Travel Screening: Not Applicable                                                                    Left message that writer will refill the furosemide 20 mg daily; writer did not see an electronic refusal come our way so unsure of what happened there. Refill sent and writer encouraged Kareen to call if it is listed incorrectly in our system. -[facility]

## 2022-06-13 ENCOUNTER — APPOINTMENT (OUTPATIENT)
Dept: URBAN - METROPOLITAN AREA CLINIC 260 | Age: 32
Setting detail: DERMATOLOGY
End: 2022-06-14

## 2022-06-13 VITALS — HEIGHT: 56 IN | WEIGHT: 104 LBS

## 2022-06-13 DIAGNOSIS — L30.9 DERMATITIS, UNSPECIFIED: ICD-10-CM

## 2022-06-13 PROCEDURE — 99204 OFFICE O/P NEW MOD 45 MIN: CPT

## 2022-06-13 PROCEDURE — OTHER PRESCRIPTION MEDICATION MANAGEMENT: OTHER

## 2022-06-13 PROCEDURE — OTHER PRESCRIPTION: OTHER

## 2022-06-13 PROCEDURE — OTHER MIPS QUALITY: OTHER

## 2022-06-13 PROCEDURE — OTHER COUNSELING: OTHER

## 2022-06-13 PROCEDURE — OTHER PHOTO-DOCUMENTATION: OTHER

## 2022-06-13 RX ORDER — TRIAMCINOLONE ACETONIDE 1 MG/G
0.1 CREAM TOPICAL BID
Qty: 30 | Refills: 2 | Status: ERX | COMMUNITY
Start: 2022-06-13

## 2022-06-13 ASSESSMENT — SEVERITY ASSESSMENT: SEVERITY: MILD

## 2022-06-13 ASSESSMENT — BSA RASH: BSA RASH: 4

## 2022-06-13 ASSESSMENT — LOCATION ZONE DERM: LOCATION ZONE: ARM

## 2022-06-13 ASSESSMENT — LOCATION SIMPLE DESCRIPTION DERM: LOCATION SIMPLE: RIGHT FOREARM

## 2022-06-13 ASSESSMENT — LOCATION DETAILED DESCRIPTION DERM: LOCATION DETAILED: RIGHT VENTRAL PROXIMAL FOREARM

## 2022-06-13 NOTE — HPI: RASH
What Type Of Note Output Would You Prefer (Optional)?: Bullet Format
Is The Patient Presenting As Previously Scheduled?: Yes
How Severe Is Your Rash?: moderate
Is This A New Presentation, Or A Follow-Up?: Rash
Additional History: Mom has not used the TMC ointment due to the greasy texture and transfer to clothing and sheets.  Rash is improving in the past several days by using emmolients

## 2022-06-13 NOTE — PROCEDURE: PRESCRIPTION MEDICATION MANAGEMENT
Render In Strict Bullet Format?: No
Plan: Use triamcinolone acetonide 0.1 % topical cream twice daily up to 15 days at a time. Recheck as needed.
Initiate Treatment: triamcinolone acetonide 0.1 % topical cream
Detail Level: Simple

## 2022-08-11 DIAGNOSIS — I43 DILATED CARDIOMYOPATHY SECONDARY TO VIRAL MYOCARDITIS (H): ICD-10-CM

## 2022-08-11 DIAGNOSIS — B33.22 DILATED CARDIOMYOPATHY SECONDARY TO VIRAL MYOCARDITIS (H): ICD-10-CM

## 2022-08-11 RX ORDER — LOSARTAN POTASSIUM 25 MG/1
TABLET ORAL
Qty: 45 TABLET | Refills: 0 | Status: SHIPPED | OUTPATIENT
Start: 2022-08-11 | End: 2022-11-07

## 2022-09-16 ENCOUNTER — OFFICE VISIT (OUTPATIENT)
Dept: CARDIOLOGY | Facility: CLINIC | Age: 32
End: 2022-09-16
Payer: MEDICARE

## 2022-09-16 VITALS
WEIGHT: 104 LBS | SYSTOLIC BLOOD PRESSURE: 124 MMHG | HEART RATE: 65 BPM | RESPIRATION RATE: 16 BRPM | BODY MASS INDEX: 22.51 KG/M2 | DIASTOLIC BLOOD PRESSURE: 86 MMHG

## 2022-09-16 DIAGNOSIS — I50.22 CHRONIC SYSTOLIC HEART FAILURE (H): Primary | ICD-10-CM

## 2022-09-16 LAB
CHOLEST SERPL-MCNC: 139 MG/DL
FASTING STATUS PATIENT QL REPORTED: ABNORMAL
HDLC SERPL-MCNC: 41 MG/DL
LDLC SERPL CALC-MCNC: 82 MG/DL
TRIGL SERPL-MCNC: 81 MG/DL

## 2022-09-16 PROCEDURE — 80061 LIPID PANEL: CPT | Performed by: INTERNAL MEDICINE

## 2022-09-16 PROCEDURE — 36415 COLL VENOUS BLD VENIPUNCTURE: CPT | Performed by: INTERNAL MEDICINE

## 2022-09-16 PROCEDURE — 99214 OFFICE O/P EST MOD 30 MIN: CPT | Performed by: INTERNAL MEDICINE

## 2022-09-16 RX ORDER — TRIAMCINOLONE ACETONIDE 1 MG/G
CREAM TOPICAL PRN
COMMUNITY
Start: 2022-06-13

## 2022-09-16 NOTE — LETTER
9/16/2022    Shiela Ceja MD  [facility] [address]   [place] [address]    RE: Kristyn Whatley       Dear Colleague,     I had the pleasure of seeing Kristyn Whatley in the [facility].      Cardiology Progress Note     Assessment:  Chronic systolic heart failure/Nonischemic dilated cardiomyopathy, normalization of LVEF LV systolic function, clinically no fluid overload, stable  Cough, nonproductive  Cerebral palsy      Plan:  Echo to reassess LV function and filling pressures  Continue current cardiac medications    Check lipid profile    Follow-up in  12 months    Subjective:   This is 32 year old female who comes in today for follow-up visit.  She has done well from the cardiac standpoint.  She has not had weight gain, she is physically active.  This summer she played [employer] golf and won state championship.  She is compliant with cardiac medications  She has had mild nonproductive cough.  She denies PND orthopnea    Her mother is concerned about  patient's cholesterol    review of Systems:   Negative other than history of present illness    Objective:   /86 (BP Location: Left arm, Patient Position: Sitting, Cuff Size: Adult Regular)   Pulse 65   Resp 16   Wt 47.2 kg (104 lb)   BMI 22.51 kg/m    Physical Exam:  GENERAL: no distress  NECK: No JVD  LUNGS: Clear to auscultation.  CARDIAC: regular rhythm, S1 & S2 normal.  No heaves, thrills, gallops or murmurs.  ABDOMEN: flat, negative hepatosplenomegaly, soft and non-tender.  EXTREMITIES: No evidence of cyanosis, clubbing or edema.    Current Outpatient Medications   Medication Sig Dispense Refill     blood pressure test kit-medium Kit [BLOOD PRESSURE TEST KIT-MEDIUM KIT] Use 1 kit As Directed 3 (three) times a week. 1 each 0     carvedilol (COREG) 25 MG tablet TAKE 1 TABLET BY MOUTH TWICE A DAY WITH MEALS 180 tablet 2     furosemide (LASIX) 20 MG tablet Take 20 mg by mouth daily as needed        loratadine (CLARITIN) 10 mg tablet [LORATADINE (CLARITIN) 10 MG TABLET] Take 10 mg by mouth daily as needed for allergies.       losartan (COZAAR) 25 MG tablet TAKE 1/2 TABLET BY G-TUBE ROUTE DAILY 45 tablet 0     triamcinolone (KENALOG) 0.1 % external cream APPLY TWICE DAILY TO AFFECTED SKIN FOR ITCH/RASH. USE UP TO 15 DAYS PER MONTH         Cardiographics:    Cardiac MR August 2015   1. Mildly dilated left ventricle with normal left ventricular wall thickness.   There is moderate global hypokinesis. The quantitative left ventricular ejection   fraction is 42%.   2. In the current study, there is no evidence of delayed gadolinium enhancement   indicating myocardial scar.   3. Normal right ventricular size and function.   4. There is no obvious valvular disease.   5. There is no pericardial effusion.      Echocardiogram: November 2020    When compared to the previous study dated 10/11/2019, no significant change.    Left ventricle ejection fraction is normal. The calculated left ventricular ejection fraction is 58%.    Normal left ventricular size and systolic function.    Normal right ventricular size and systolic function.    No hemodynamically significant valvular heart abnormalities.       Lab Results    Chemistry/lipid CBC Cardiac Enzymes/BNP/TSH/INR   No results for input(s): CHOL, HDL, LDL, TRIG, CHOLHDLRATIO in the last 99086 hours.  No results for input(s): LDL in the last 83980 hours.  Recent Labs   Lab Test 06/15/21  1900 01/17/20  1148   NA  --  139   POTASSIUM  --  4.3   CHLORIDE  --  103   CO2  --  21*   GLC  --  85   BUN  --  6*   CR 0.61 0.52*   GFRESTIMATED >60 >60   FAB  --  9.2     Recent Labs   Lab Test 06/15/21  1900 01/17/20  1148 02/27/18  1532   CR 0.61 0.52* 0.58*     No results for input(s): A1C in the last 78208 hours.       Recent Labs   Lab Test 06/15/21  1900 01/22/20  1413   WBC  --  6.5   HGB  --  13.3   HCT  --  41.0   MCV  --  90    333     Recent Labs   Lab Test  01/22/20  1413   HGB 13.3    No results for input(s): TROPONINI in the last 94643 hours.  Recent Labs   Lab Test 10/02/19  1551   BNP <10     Recent Labs   Lab Test 02/11/22  1408   TSH 1.23     No results for input(s): INR in the last 71716 hours.               Thank you for allowing me to participate in the care of your patient.      Sincerely,     Domo Ray MD     [facility]  cc:   Domo Ray MD  [address]  [address]  [place] [address]

## 2022-09-16 NOTE — PROGRESS NOTES
Cardiology Progress Note     Assessment:  Chronic systolic heart failure/Nonischemic dilated cardiomyopathy, normalization of LVEF LV systolic function, clinically no fluid overload, stable  Cough, nonproductive  Cerebral palsy      Plan:  Echo to reassess LV function and filling pressures  Continue current cardiac medications    Check lipid profile    Follow-up in  12 months    Subjective:   This is 32 year old female who comes in today for follow-up visit.  She has done well from the cardiac standpoint.  She has not had weight gain, she is physically active.  This summer she played [employer] golf and won [employer] championship.  She is compliant with cardiac medications  She has had mild nonproductive cough.  She denies PND orthopnea    Her mother is concerned about  patient's cholesterol    review of Systems:   Negative other than history of present illness    Objective:   /86 (BP Location: Left arm, Patient Position: Sitting, Cuff Size: Adult Regular)   Pulse 65   Resp 16   Wt 47.2 kg (104 lb)   BMI 22.51 kg/m    Physical Exam:  GENERAL: no distress  NECK: No JVD  LUNGS: Clear to auscultation.  CARDIAC: regular rhythm, S1 & S2 normal.  No heaves, thrills, gallops or murmurs.  ABDOMEN: flat, negative hepatosplenomegaly, soft and non-tender.  EXTREMITIES: No evidence of cyanosis, clubbing or edema.    Current Outpatient Medications   Medication Sig Dispense Refill     blood pressure test kit-medium Kit [BLOOD PRESSURE TEST KIT-MEDIUM KIT] Use 1 kit As Directed 3 (three) times a week. 1 each 0     carvedilol (COREG) 25 MG tablet TAKE 1 TABLET BY MOUTH TWICE A DAY WITH MEALS 180 tablet 2     furosemide (LASIX) 20 MG tablet Take 20 mg by mouth daily as needed       loratadine (CLARITIN) 10 mg tablet [LORATADINE (CLARITIN) 10 MG TABLET] Take 10 mg by mouth daily as needed for allergies.       losartan (COZAAR) 25 MG tablet TAKE 1/2 TABLET BY G-TUBE ROUTE DAILY 45 tablet 0     triamcinolone (KENALOG)  0.1 % external cream APPLY TWICE DAILY TO AFFECTED SKIN FOR ITCH/RASH. USE UP TO 15 DAYS PER MONTH         Cardiographics:    Cardiac MR August 2015   1. Mildly dilated left ventricle with normal left ventricular wall thickness.   There is moderate global hypokinesis. The quantitative left ventricular ejection   fraction is 42%.   2. In the current study, there is no evidence of delayed gadolinium enhancement   indicating myocardial scar.   3. Normal right ventricular size and function.   4. There is no obvious valvular disease.   5. There is no pericardial effusion.      Echocardiogram: November 2020    When compared to the previous study dated 10/11/2019, no significant change.    Left ventricle ejection fraction is normal. The calculated left ventricular ejection fraction is 58%.    Normal left ventricular size and systolic function.    Normal right ventricular size and systolic function.    No hemodynamically significant valvular heart abnormalities.       Lab Results    Chemistry/lipid CBC Cardiac Enzymes/BNP/TSH/INR   No results for input(s): CHOL, HDL, LDL, TRIG, CHOLHDLRATIO in the last 64880 hours.  No results for input(s): LDL in the last 09079 hours.  Recent Labs   Lab Test 06/15/21  1900 01/17/20  1148   NA  --  139   POTASSIUM  --  4.3   CHLORIDE  --  103   CO2  --  21*   GLC  --  85   BUN  --  6*   CR 0.61 0.52*   GFRESTIMATED >60 >60   FAB  --  9.2     Recent Labs   Lab Test 06/15/21  1900 01/17/20  1148 02/27/18  1532   CR 0.61 0.52* 0.58*     No results for input(s): A1C in the last 81211 hours.       Recent Labs   Lab Test 06/15/21  1900 01/22/20  1413   WBC  --  6.5   HGB  --  13.3   HCT  --  41.0   MCV  --  90    333     Recent Labs   Lab Test 01/22/20  1413   HGB 13.3    No results for input(s): TROPONINI in the last 99103 hours.  Recent Labs   Lab Test 10/02/19  1551   BNP <10     Recent Labs   Lab Test 02/11/22  1408   TSH 1.23     No results for input(s): INR in the last 79205 hours.

## 2022-09-16 NOTE — PATIENT INSTRUCTIONS
Kristyn Whatley,    It was a pleasure to see you today at the [facility].     My recommendations after this visit include:    Lipids  echo    WRossy Ray MD, FACC, VEE

## 2022-09-22 ENCOUNTER — HOSPITAL ENCOUNTER (OUTPATIENT)
Dept: CARDIOLOGY | Facility: CLINIC | Age: 32
Discharge: HOME | End: 2022-09-22
Attending: INTERNAL MEDICINE | Admitting: INTERNAL MEDICINE
Payer: MEDICARE

## 2022-09-22 DIAGNOSIS — I50.22 CHRONIC SYSTOLIC HEART FAILURE (H): ICD-10-CM

## 2022-09-22 LAB — LVEF ECHO: NORMAL

## 2022-09-22 PROCEDURE — 255N000002 HC RX 255 OP 636: Performed by: INTERNAL MEDICINE

## 2022-09-22 PROCEDURE — 999N000208 ECHOCARDIOGRAM COMPLETE

## 2022-09-22 PROCEDURE — 93306 TTE W/DOPPLER COMPLETE: CPT | Mod: 26 | Performed by: INTERNAL MEDICINE

## 2022-09-22 RX ADMIN — PERFLUTREN 3 ML: 6.52 INJECTION, SUSPENSION INTRAVENOUS at 11:46

## 2022-10-01 ENCOUNTER — HEALTH MAINTENANCE LETTER (OUTPATIENT)
Age: 32
End: 2022-10-01

## 2022-11-14 DIAGNOSIS — I43 DILATED CARDIOMYOPATHY SECONDARY TO VIRAL MYOCARDITIS (H): ICD-10-CM

## 2022-11-14 DIAGNOSIS — B33.22 DILATED CARDIOMYOPATHY SECONDARY TO VIRAL MYOCARDITIS (H): ICD-10-CM

## 2022-11-14 RX ORDER — CARVEDILOL 25 MG/1
TABLET ORAL
Qty: 180 TABLET | Refills: 2 | Status: SHIPPED | OUTPATIENT
Start: 2022-11-14 | End: 2023-08-07

## 2023-02-05 ENCOUNTER — HEALTH MAINTENANCE LETTER (OUTPATIENT)
Age: 33
End: 2023-02-05

## 2023-04-17 ENCOUNTER — TELEPHONE (OUTPATIENT)
Dept: CARDIOLOGY | Facility: CLINIC | Age: 33
End: 2023-04-17
Payer: MEDICARE

## 2023-04-17 DIAGNOSIS — I50.22 CHRONIC SYSTOLIC HEART FAILURE (H): Primary | ICD-10-CM

## 2023-04-17 RX ORDER — FUROSEMIDE 20 MG
20 TABLET ORAL DAILY PRN
Qty: 90 TABLET | Refills: 3 | Status: SHIPPED | OUTPATIENT
Start: 2023-04-17 | End: 2024-03-27

## 2023-04-17 NOTE — TELEPHONE ENCOUNTER
Health Call Center    Phone Message    May a detailed message be left on voicemail: yes     Reason for Call: Medication Refill Request    Has the patient contacted the pharmacy for the refill? Yes   Name of medication being requested: furosemide (LASIX) 20 MG tablet  Provider who prescribed the medication: Dr. Ray  Pharmacy: [id number] - [place], [place] - [address] AT [address]    Date medication is needed: 04/17/1991      Pt only has 5 pills left.       Action Taken: Message routed to:  Other: Cardiology    Travel Screening: Not Applicable     Thank you!  [facility]

## 2023-04-19 ENCOUNTER — LAB REQUISITION (OUTPATIENT)
Dept: LAB | Facility: CLINIC | Age: 33
End: 2023-04-19
Payer: MEDICARE

## 2023-04-19 ENCOUNTER — TRANSFERRED RECORDS (OUTPATIENT)
Dept: HEALTH INFORMATION MANAGEMENT | Facility: CLINIC | Age: 33
End: 2023-04-19

## 2023-04-19 DIAGNOSIS — R50.9 FEVER, UNSPECIFIED: ICD-10-CM

## 2023-04-19 DIAGNOSIS — R63.4 ABNORMAL WEIGHT LOSS: ICD-10-CM

## 2023-04-19 LAB
ALBUMIN SERPL BCG-MCNC: 4.6 G/DL (ref 3.5–5.2)
ALP SERPL-CCNC: 51 U/L (ref 35–104)
ALT SERPL W P-5'-P-CCNC: 15 U/L (ref 10–35)
ANION GAP SERPL CALCULATED.3IONS-SCNC: 13 MMOL/L (ref 7–15)
AST SERPL W P-5'-P-CCNC: 16 U/L (ref 10–35)
BILIRUB SERPL-MCNC: 0.4 MG/DL
BUN SERPL-MCNC: 10.5 MG/DL (ref 6–20)
CALCIUM SERPL-MCNC: 9.3 MG/DL (ref 8.6–10)
CHLORIDE SERPL-SCNC: 103 MMOL/L (ref 98–107)
CREAT SERPL-MCNC: 0.49 MG/DL (ref 0.51–0.95)
DEPRECATED HCO3 PLAS-SCNC: 24 MMOL/L (ref 22–29)
GFR SERPL CREATININE-BSD FRML MDRD: >90 ML/MIN/1.73M2
GLUCOSE SERPL-MCNC: 87 MG/DL (ref 70–99)
POTASSIUM SERPL-SCNC: 4.3 MMOL/L (ref 3.4–5.3)
PROT SERPL-MCNC: 7.3 G/DL (ref 6.4–8.3)
SODIUM SERPL-SCNC: 140 MMOL/L (ref 136–145)

## 2023-04-19 PROCEDURE — 87086 URINE CULTURE/COLONY COUNT: CPT | Mod: ORL | Performed by: NURSE PRACTITIONER

## 2023-04-19 PROCEDURE — 80053 COMPREHEN METABOLIC PANEL: CPT | Mod: ORL | Performed by: NURSE PRACTITIONER

## 2023-04-21 LAB — BACTERIA UR CULT: NORMAL

## 2023-08-07 DIAGNOSIS — I43 DILATED CARDIOMYOPATHY SECONDARY TO VIRAL MYOCARDITIS (H): ICD-10-CM

## 2023-08-07 DIAGNOSIS — B33.22 DILATED CARDIOMYOPATHY SECONDARY TO VIRAL MYOCARDITIS (H): ICD-10-CM

## 2023-08-07 RX ORDER — CARVEDILOL 25 MG/1
25 TABLET ORAL 2 TIMES DAILY WITH MEALS
Qty: 180 TABLET | Refills: 0 | Status: SHIPPED | OUTPATIENT
Start: 2023-08-07 | End: 2023-11-03

## 2023-11-03 DIAGNOSIS — I43 DILATED CARDIOMYOPATHY SECONDARY TO VIRAL MYOCARDITIS (H): ICD-10-CM

## 2023-11-03 DIAGNOSIS — B33.22 DILATED CARDIOMYOPATHY SECONDARY TO VIRAL MYOCARDITIS (H): ICD-10-CM

## 2023-11-03 RX ORDER — CARVEDILOL 25 MG/1
TABLET ORAL
Qty: 180 TABLET | Refills: 0 | Status: SHIPPED | OUTPATIENT
Start: 2023-11-03 | End: 2023-11-07

## 2023-11-03 RX ORDER — LOSARTAN POTASSIUM 25 MG/1
TABLET ORAL
Qty: 45 TABLET | Refills: 3 | Status: SHIPPED | OUTPATIENT
Start: 2023-11-03 | End: 2024-03-01

## 2023-11-07 DIAGNOSIS — I43 DILATED CARDIOMYOPATHY SECONDARY TO VIRAL MYOCARDITIS (H): ICD-10-CM

## 2023-11-07 DIAGNOSIS — B33.22 DILATED CARDIOMYOPATHY SECONDARY TO VIRAL MYOCARDITIS (H): ICD-10-CM

## 2023-11-07 RX ORDER — CARVEDILOL 25 MG/1
25 TABLET ORAL 2 TIMES DAILY WITH MEALS
Qty: 180 TABLET | Refills: 0 | Status: SHIPPED | OUTPATIENT
Start: 2023-11-07 | End: 2024-02-01

## 2024-02-01 DIAGNOSIS — B33.22 DILATED CARDIOMYOPATHY SECONDARY TO VIRAL MYOCARDITIS (H): ICD-10-CM

## 2024-02-01 DIAGNOSIS — I43 DILATED CARDIOMYOPATHY SECONDARY TO VIRAL MYOCARDITIS (H): ICD-10-CM

## 2024-02-01 RX ORDER — CARVEDILOL 25 MG/1
25 TABLET ORAL 2 TIMES DAILY WITH MEALS
Qty: 180 TABLET | Refills: 0 | Status: SHIPPED | OUTPATIENT
Start: 2024-02-01 | End: 2024-03-01

## 2024-02-01 NOTE — TELEPHONE ENCOUNTER
Overdue for follow-up has appt with [facility] arranged for in March. Rx refill sent to cover til OV. AYSHA,RN

## 2024-03-01 ENCOUNTER — OFFICE VISIT (OUTPATIENT)
Dept: CARDIOLOGY | Facility: CLINIC | Age: 34
End: 2024-03-01
Payer: MEDICARE

## 2024-03-01 VITALS
DIASTOLIC BLOOD PRESSURE: 72 MMHG | HEART RATE: 74 BPM | RESPIRATION RATE: 16 BRPM | SYSTOLIC BLOOD PRESSURE: 122 MMHG | OXYGEN SATURATION: 99 % | BODY MASS INDEX: 21.99 KG/M2 | WEIGHT: 101.6 LBS

## 2024-03-01 DIAGNOSIS — I50.22 CHRONIC SYSTOLIC HEART FAILURE (H): Primary | ICD-10-CM

## 2024-03-01 DIAGNOSIS — I42.0 DILATED CARDIOMYOPATHY (H): ICD-10-CM

## 2024-03-01 DIAGNOSIS — B33.22 DILATED CARDIOMYOPATHY SECONDARY TO VIRAL MYOCARDITIS (H): ICD-10-CM

## 2024-03-01 DIAGNOSIS — I43 DILATED CARDIOMYOPATHY SECONDARY TO VIRAL MYOCARDITIS (H): ICD-10-CM

## 2024-03-01 PROCEDURE — 99214 OFFICE O/P EST MOD 30 MIN: CPT | Performed by: INTERNAL MEDICINE

## 2024-03-01 RX ORDER — LOSARTAN POTASSIUM 25 MG/1
TABLET ORAL
Qty: 45 TABLET | Refills: 11 | Status: SHIPPED | OUTPATIENT
Start: 2024-03-01

## 2024-03-01 RX ORDER — CARVEDILOL 25 MG/1
25 TABLET ORAL 2 TIMES DAILY WITH MEALS
Qty: 180 TABLET | Refills: 3 | Status: SHIPPED | OUTPATIENT
Start: 2024-03-01

## 2024-03-01 NOTE — LETTER
3/1/2024    Shiela Ceja MD  [address]   [place] [address]    RE: Kristyn Whatley       Dear Colleague,     I had the pleasure of seeing Kristyn Whatley in the [facility].      Cardiology Progress Note     Assessment:    Chronic systolic heart failure/nonischemic dilated cardiomyopathy, LVEF 20% at the lowest point now borderline/low normal 50 to 55%, clinically no fluid overload, stable  Cerebral palsy    Plan:  Continue current medications  Echo to reassess LV function    Follow-up in 1 year    Subjective:   This is 34 year old female who comes in today for visit.  She stays physically active - she has not had weight gain, PND, orthopnea.  She denies heart palpitations or syncope.    Review of Systems:   Negative other than history of present illness    Objective:   /72 (BP Location: Right arm, Patient Position: Sitting, Cuff Size: Adult Small)   Pulse 74   Resp 16   Wt 46.1 kg (101 lb 9.6 oz)   SpO2 99%   BMI 21.99 kg/m    Physical Exam:  GENERAL: no distress  NECK: No JVD  LUNGS: Clear to auscultation.  CARDIAC: regular rhythm, S1 & S2 normal.  No heaves, thrills, gallops or murmurs.  ABDOMEN: flat, negative hepatosplenomegaly, soft and non-tender.  EXTREMITIES: No evidence of cyanosis, clubbing or edema.    Current Outpatient Medications   Medication Sig Dispense Refill    blood pressure test kit-medium Kit [BLOOD PRESSURE TEST KIT-MEDIUM KIT] Use 1 kit As Directed 3 (three) times a week. (Patient taking differently: 1 kit as needed) 1 each 0    carvedilol (COREG) 25 MG tablet TAKE 1 TABLET (25 MG) BY MOUTH 2 TIMES DAILY (WITH MEALS) INSTRUCT PT TO REQUEST FURTHER REFILLS AT FOLLOW-UP APPT.  tablet 0    furosemide (LASIX) 20 MG tablet Take 1 tablet (20 mg) by mouth daily as needed (fluid retention) 90 tablet 3    loratadine (CLARITIN) 10 mg tablet Take 10 mg by mouth as needed      losartan (COZAAR) 25 MG tablet TAKE 1/2 TABLET BY G-TUBE ROUTE DAILY 45  "tablet 3    triamcinolone (KENALOG) 0.1 % external cream as needed         Cardiographics:    Cardiac MR August 2015   1. Mildly dilated left ventricle with normal left ventricular wall thickness.   There is moderate global hypokinesis. The quantitative left ventricular ejection   fraction is 42%.   2. In the current study, there is no evidence of delayed gadolinium enhancement   indicating myocardial scar.   3. Normal right ventricular size and function.   4. There is no obvious valvular disease.   5. There is no pericardial effusion.      Echocardiogram: November 2020  When compared to the previous study dated 10/11/2019, no significant change.  Left ventricle ejection fraction is normal. The calculated left ventricular ejection fraction is 58%.  Normal left ventricular size and systolic function.  Normal right ventricular size and systolic function.  No hemodynamically significant valvular heart abnormalities.    September 2022  The left ventricle is borderline dilated.  Left ventricular function is decreased. The ejection fraction is 50-55%  (borderline).  Normal right ventricle size and systolic function.  IVC diameter <2.1 cm collapsing >50% with sniff suggests a normal RA pressure  of 3 mmHg.  No hemodynamically significant valvular abnormalities on 2D or color flow  imaging.   Lab Results    Chemistry/lipid CBC Cardiac Enzymes/BNP/TSH/INR   Recent Labs   Lab Test 09/16/22  1124   CHOL 139   HDL 41*   LDL 82   TRIG 81     Recent Labs   Lab Test 09/16/22  1124   LDL 82     Recent Labs   Lab Test 04/19/23  0915      POTASSIUM 4.3   CHLORIDE 103   CO2 24   GLC 87   BUN 10.5   CR 0.49*   GFRESTIMATED >90   FAB 9.3     Recent Labs   Lab Test 04/19/23  0915 06/15/21  1900 01/17/20  1148   CR 0.49* 0.61 0.52*     No results for input(s): \"A1C\" in the last 74895 hours.       Recent Labs   Lab Test 06/15/21  1900 01/22/20  1413   WBC  --  6.5   HGB  --  13.3   HCT  --  41.0   MCV  --  90    333     Recent " "Labs   Lab Test 01/22/20  1413   HGB 13.3    No results for input(s): \"TROPONINI\" in the last 28610 hours.  Recent Labs   Lab Test 10/02/19  1551   BNP <10     Recent Labs   Lab Test 02/11/22  1408   TSH 1.23     No results for input(s): \"INR\" in the last 58127 hours.                      Thank you for allowing me to participate in the care of your patient.      Sincerely,     Domo Ray MD     [facility]  cc:   Jalen Ray MD  [address]  [address]  [place] [address]      "

## 2024-03-01 NOTE — PROGRESS NOTES
Cardiology Progress Note     Assessment:    Chronic systolic heart failure/nonischemic dilated cardiomyopathy, LVEF 20% at the lowest point now borderline/low normal 50 to 55%, clinically no fluid overload, stable  Cerebral palsy    Plan:  Continue current medications  Echo to reassess LV function    Follow-up in 1 year    Subjective:   This is 34 year old female who comes in today for visit.  She stays physically active - she has not had weight gain, PND, orthopnea.  She denies heart palpitations or syncope.    Review of Systems:   Negative other than history of present illness    Objective:   /72 (BP Location: Right arm, Patient Position: Sitting, Cuff Size: Adult Small)   Pulse 74   Resp 16   Wt 46.1 kg (101 lb 9.6 oz)   SpO2 99%   BMI 21.99 kg/m    Physical Exam:  GENERAL: no distress  NECK: No JVD  LUNGS: Clear to auscultation.  CARDIAC: regular rhythm, S1 & S2 normal.  No heaves, thrills, gallops or murmurs.  ABDOMEN: flat, negative hepatosplenomegaly, soft and non-tender.  EXTREMITIES: No evidence of cyanosis, clubbing or edema.    Current Outpatient Medications   Medication Sig Dispense Refill    blood pressure test kit-medium Kit [BLOOD PRESSURE TEST KIT-MEDIUM KIT] Use 1 kit As Directed 3 (three) times a week. (Patient taking differently: 1 kit as needed) 1 each 0    carvedilol (COREG) 25 MG tablet TAKE 1 TABLET (25 MG) BY MOUTH 2 TIMES DAILY (WITH MEALS) INSTRUCT PT TO REQUEST FURTHER REFILLS AT FOLLOW-UP APPT.  tablet 0    furosemide (LASIX) 20 MG tablet Take 1 tablet (20 mg) by mouth daily as needed (fluid retention) 90 tablet 3    loratadine (CLARITIN) 10 mg tablet Take 10 mg by mouth as needed      losartan (COZAAR) 25 MG tablet TAKE 1/2 TABLET BY G-TUBE ROUTE DAILY 45 tablet 3    triamcinolone (KENALOG) 0.1 % external cream as needed         Cardiographics:    Cardiac MR August 2015   1. Mildly dilated left ventricle with normal left ventricular wall thickness.   There is  "moderate global hypokinesis. The quantitative left ventricular ejection   fraction is 42%.   2. In the current study, there is no evidence of delayed gadolinium enhancement   indicating myocardial scar.   3. Normal right ventricular size and function.   4. There is no obvious valvular disease.   5. There is no pericardial effusion.      Echocardiogram: November 2020  When compared to the previous study dated 10/11/2019, no significant change.  Left ventricle ejection fraction is normal. The calculated left ventricular ejection fraction is 58%.  Normal left ventricular size and systolic function.  Normal right ventricular size and systolic function.  No hemodynamically significant valvular heart abnormalities.    September 2022  The left ventricle is borderline dilated.  Left ventricular function is decreased. The ejection fraction is 50-55%  (borderline).  Normal right ventricle size and systolic function.  IVC diameter <2.1 cm collapsing >50% with sniff suggests a normal RA pressure  of 3 mmHg.  No hemodynamically significant valvular abnormalities on 2D or color flow  imaging.   Lab Results    Chemistry/lipid CBC Cardiac Enzymes/BNP/TSH/INR   Recent Labs   Lab Test 09/16/22  1124   CHOL 139   HDL 41*   LDL 82   TRIG 81     Recent Labs   Lab Test 09/16/22  1124   LDL 82     Recent Labs   Lab Test 04/19/23  0915      POTASSIUM 4.3   CHLORIDE 103   CO2 24   GLC 87   BUN 10.5   CR 0.49*   GFRESTIMATED >90   FAB 9.3     Recent Labs   Lab Test 04/19/23  0915 06/15/21  1900 01/17/20  1148   CR 0.49* 0.61 0.52*     No results for input(s): \"A1C\" in the last 13707 hours.       Recent Labs   Lab Test 06/15/21  1900 01/22/20  1413   WBC  --  6.5   HGB  --  13.3   HCT  --  41.0   MCV  --  90    333     Recent Labs   Lab Test 01/22/20  1413   HGB 13.3    No results for input(s): \"TROPONINI\" in the last 95024 hours.  Recent Labs   Lab Test 10/02/19  1551   BNP <10     Recent Labs   Lab Test 02/11/22  1408   TSH " "1.23     No results for input(s): \"INR\" in the last 57551 hours.                  "

## 2024-03-07 ENCOUNTER — HOSPITAL ENCOUNTER (OUTPATIENT)
Dept: CARDIOLOGY | Facility: CLINIC | Age: 34
Discharge: HOME | End: 2024-03-07
Attending: INTERNAL MEDICINE | Admitting: INTERNAL MEDICINE
Payer: MEDICARE

## 2024-03-07 DIAGNOSIS — I42.0 DILATED CARDIOMYOPATHY (H): ICD-10-CM

## 2024-03-07 DIAGNOSIS — I50.22 CHRONIC SYSTOLIC HEART FAILURE (H): ICD-10-CM

## 2024-03-07 LAB — LVEF ECHO: NORMAL

## 2024-03-07 PROCEDURE — 255N000002 HC RX 255 OP 636: Performed by: INTERNAL MEDICINE

## 2024-03-07 PROCEDURE — 93306 TTE W/DOPPLER COMPLETE: CPT | Mod: 26 | Performed by: STUDENT IN AN ORGANIZED HEALTH CARE EDUCATION/TRAINING PROGRAM

## 2024-03-07 PROCEDURE — 999N000248 HC STATISTIC IV INSERT WITH US BY RN

## 2024-03-07 RX ADMIN — PERFLUTREN 2 ML: 6.52 INJECTION, SUSPENSION INTRAVENOUS at 14:22

## 2024-03-09 ENCOUNTER — HEALTH MAINTENANCE LETTER (OUTPATIENT)
Age: 34
End: 2024-03-09

## 2024-03-27 DIAGNOSIS — I50.22 CHRONIC SYSTOLIC HEART FAILURE (H): ICD-10-CM

## 2024-03-27 RX ORDER — FUROSEMIDE 20 MG
20 TABLET ORAL DAILY PRN
Qty: 90 TABLET | Refills: 3 | Status: SHIPPED | OUTPATIENT
Start: 2024-03-27

## 2024-06-26 ENCOUNTER — APPOINTMENT (OUTPATIENT)
Dept: URBAN - METROPOLITAN AREA CLINIC 260 | Age: 34
Setting detail: DERMATOLOGY
End: 2024-06-27

## 2024-06-26 VITALS — RESPIRATION RATE: 14 BRPM | WEIGHT: 114 LBS | HEIGHT: 55 IN

## 2024-06-26 DIAGNOSIS — L24 IRRITANT CONTACT DERMATITIS: ICD-10-CM

## 2024-06-26 PROBLEM — L24.9 IRRITANT CONTACT DERMATITIS, UNSPECIFIED CAUSE: Status: ACTIVE | Noted: 2024-06-26

## 2024-06-26 PROCEDURE — OTHER PRESCRIPTION MEDICATION MANAGEMENT: OTHER

## 2024-06-26 PROCEDURE — OTHER MIPS QUALITY: OTHER

## 2024-06-26 PROCEDURE — OTHER PRESCRIPTION: OTHER

## 2024-06-26 PROCEDURE — OTHER COUNSELING: OTHER

## 2024-06-26 PROCEDURE — OTHER PHOTO-DOCUMENTATION: OTHER

## 2024-06-26 PROCEDURE — 99214 OFFICE O/P EST MOD 30 MIN: CPT

## 2024-06-26 PROCEDURE — OTHER ADDITIONAL NOTES: OTHER

## 2024-06-26 RX ORDER — TRIAMCINOLONE ACETONIDE 1 MG/G
0.1% CREAM TOPICAL
Qty: 80 | Refills: 2 | Status: ERX | COMMUNITY
Start: 2024-06-26

## 2024-06-26 ASSESSMENT — LOCATION DETAILED DESCRIPTION DERM
LOCATION DETAILED: RIGHT PROXIMAL DORSAL FOREARM
LOCATION DETAILED: LEFT PROXIMAL DORSAL FOREARM

## 2024-06-26 ASSESSMENT — LOCATION ZONE DERM: LOCATION ZONE: ARM

## 2024-06-26 ASSESSMENT — LOCATION SIMPLE DESCRIPTION DERM
LOCATION SIMPLE: RIGHT FOREARM
LOCATION SIMPLE: LEFT FOREARM

## 2024-06-26 NOTE — PROCEDURE: PRESCRIPTION MEDICATION MANAGEMENT
Detail Level: Simple
Render In Strict Bullet Format?: No
Initiate Treatment: Triamcinolone acetonide 0.1 % topical cream. Apply to affected areas on arms twice daily for up to 3 weeks. Take a 1 week break. Repeat as needed for flares.

## 2024-06-26 NOTE — HPI: RASH
What Type Of Note Output Would You Prefer (Optional)?: Standard Output
Is The Patient Presenting As Previously Scheduled?: Yes
How Severe Is Your Rash?: moderate
Is This A New Presentation, Or A Follow-Up?: Rash
Additional History: Patient was here two years ago for a rash that was on the same arm but more blistery. Patients mother states that it is larger and darker than that rash two yrs ago. Has been using an older Rx and it has been helping- triamcinolone cream. Patients mother states that her Rx needs to be a cream as she cannot tolerate the feeling of any other topical. Reports no itching or pain.

## 2024-06-26 NOTE — PROCEDURE: ADDITIONAL NOTES
Additional Notes: Advised to wear long sleeve clothing when doing outdoor yard work. \\nAdvised to wear sunscreen when outdoors as well. Recommended CeraVe or Cetaphil.
Render Risk Assessment In Note?: no
Detail Level: Simple

## 2024-06-26 NOTE — PROCEDURE: PHOTO-DOCUMENTATION
Details (Free Text): Forearms
Detail Level: Zone
Photo Preface (Leave Blank If You Do Not Want): Photographs were obtained today

## 2025-03-13 ENCOUNTER — TRANSFERRED RECORDS (OUTPATIENT)
Dept: HEALTH INFORMATION MANAGEMENT | Facility: CLINIC | Age: 35
End: 2025-03-13
Payer: MEDICARE

## 2025-03-16 ENCOUNTER — HEALTH MAINTENANCE LETTER (OUTPATIENT)
Age: 35
End: 2025-03-16

## 2025-03-19 ENCOUNTER — OFFICE VISIT (OUTPATIENT)
Dept: CARDIOLOGY | Facility: CLINIC | Age: 35
End: 2025-03-19
Payer: MEDICARE

## 2025-03-19 VITALS
BODY MASS INDEX: 22.11 KG/M2 | WEIGHT: 102.19 LBS | SYSTOLIC BLOOD PRESSURE: 111 MMHG | HEART RATE: 76 BPM | OXYGEN SATURATION: 100 % | DIASTOLIC BLOOD PRESSURE: 72 MMHG

## 2025-03-19 DIAGNOSIS — I42.0 DILATED CARDIOMYOPATHY (H): ICD-10-CM

## 2025-03-19 DIAGNOSIS — I50.22 CHRONIC SYSTOLIC HEART FAILURE (H): Primary | ICD-10-CM

## 2025-03-19 LAB
ANION GAP SERPL CALCULATED.3IONS-SCNC: 10 MMOL/L (ref 7–15)
BUN SERPL-MCNC: 12.2 MG/DL (ref 6–20)
CALCIUM SERPL-MCNC: 9.3 MG/DL (ref 8.8–10.4)
CHLORIDE SERPL-SCNC: 104 MMOL/L (ref 98–107)
CREAT SERPL-MCNC: 0.45 MG/DL (ref 0.51–0.95)
EGFRCR SERPLBLD CKD-EPI 2021: >90 ML/MIN/1.73M2
GLUCOSE SERPL-MCNC: 92 MG/DL (ref 70–99)
HCO3 SERPL-SCNC: 24 MMOL/L (ref 22–29)
NT-PROBNP SERPL-MCNC: 56 PG/ML (ref 0–450)
POTASSIUM SERPL-SCNC: 4.1 MMOL/L (ref 3.4–5.3)
SODIUM SERPL-SCNC: 138 MMOL/L (ref 135–145)

## 2025-03-19 PROCEDURE — 99214 OFFICE O/P EST MOD 30 MIN: CPT | Performed by: INTERNAL MEDICINE

## 2025-03-19 PROCEDURE — 3078F DIAST BP <80 MM HG: CPT | Performed by: INTERNAL MEDICINE

## 2025-03-19 PROCEDURE — 3074F SYST BP LT 130 MM HG: CPT | Performed by: INTERNAL MEDICINE

## 2025-03-19 PROCEDURE — G2211 COMPLEX E/M VISIT ADD ON: HCPCS | Performed by: INTERNAL MEDICINE

## 2025-03-19 NOTE — PROGRESS NOTES
Cardiology Progress Note     Assessment:  Chronic systolic heart failure/nonischemic dilated cardiomyopathy, LVEF 20% at the lowest point, now mildly decreased 40-45%, clinically no fluid overload, stable  Cerebral palsy      Plan:  BMP and BNP today  Continue current cardiac medications  May need decreased dose of furosemide    If BNP significantly elevated we will reassess LV function with echo    Routine follow-up in 1 year  The longitudinal plan of care for the diagnosis(es)/condition(s) as documented were addressed during this visit. Due to the added complexity in care, I will continue to support Kristyn in the subsequent management and with ongoing continuity of care.   Subjective:   This is 35 year old female who comes in today for follow-up visit.  She has done well.  She denies exertional chest pain or shortness of breath.  Her weight has been stable.  She has no PND orthopnea.  She is compliant with medications    Review of Systems:   Negative other than history of present illness    Objective:   /72 (BP Location: Right arm, Patient Position: Sitting, Cuff Size: Adult Regular)   Pulse 76   Wt 46.4 kg (102 lb 3 oz)   SpO2 100%   BMI 22.11 kg/m    Physical Exam:  GENERAL: no distress  NECK: No JVD  LUNGS: Clear to auscultation.  CARDIAC: regular rhythm, S1 & S2 normal.  No heaves, thrills, gallops or murmurs.  ABDOMEN: flat, negative hepatosplenomegaly, soft and non-tender.  EXTREMITIES: No evidence of cyanosis, clubbing or edema.    Current Outpatient Medications   Medication Sig Dispense Refill    carvedilol (COREG) 25 MG tablet Take 1 tablet (25 mg) by mouth 2 times daily (with meals) Instruct pt to request further refills at follow-up appt.  mg 180 tablet 3    furosemide (LASIX) 20 MG tablet TAKE 1 TABLET (20 MG) BY MOUTH DAILY AS NEEDED (FLUID RETENTION) 90 tablet 3    loratadine (CLARITIN) 10 mg tablet Take 10 mg by mouth as needed      losartan (COZAAR) 25 MG tablet TAKE 1/2 TABLET BY  G-TUBE ROUTE DAILY 45 tablet 11    triamcinolone (KENALOG) 0.1 % external cream as needed      blood pressure test kit-medium Kit [BLOOD PRESSURE TEST KIT-MEDIUM KIT] Use 1 kit As Directed 3 (three) times a week. (Patient not taking: Reported on 3/19/2025) 1 each 0       Cardiographics:    Cardiac MR August 2015   1. Mildly dilated left ventricle with normal left ventricular wall thickness.   There is moderate global hypokinesis. The quantitative left ventricular ejection   fraction is 42%.   2. In the current study, there is no evidence of delayed gadolinium enhancement   indicating myocardial scar.   3. Normal right ventricular size and function.   4. There is no obvious valvular disease.   5. There is no pericardial effusion.      Echocardiogram:      September 2022  The left ventricle is borderline dilated.  Left ventricular function is decreased. The ejection fraction is 50-55%  (borderline).  Normal right ventricle size and systolic function.  IVC diameter <2.1 cm collapsing >50% with sniff suggests a normal RA pressure  of 3 mmHg.  No hemodynamically significant valvular abnormalities on 2D or color flow  imaging.    March 2024  The left ventricle is normal in size. There is normal left ventricular wall  thickness.  Left ventricular function is decreased. The ejection fraction is 45-50%  (mildly reduced). There is diffuse hypokinesis of the left ventricle.  There are prominent apical trabeculae. 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     The right ventricle is normal in size and function.  Normal left atrial size. Right atrial size is normal.  IVC diameter <2.1 cm collapsing >50% with sniff suggests a normal RA pressure  of 3 mmHg.     Lab Results    Chemistry/lipid CBC Cardiac Enzymes/BNP/TSH/INR   Recent Labs   Lab Test 09/16/22  1124   CHOL 139   HDL 41*   LDL 82   TRIG 81     Recent Labs   Lab Test 09/16/22  1124   LDL 82     Recent Labs   Lab Test 04/19/23  0915      POTASSIUM  "4.3   CHLORIDE 103   CO2 24   GLC 87   BUN 10.5   CR 0.49*   GFRESTIMATED >90   FAB 9.3     Recent Labs   Lab Test 04/19/23  0915 06/15/21  1900 01/17/20  1148   CR 0.49* 0.61 0.52*     No results for input(s): \"A1C\" in the last 11132 hours.       Recent Labs   Lab Test 06/15/21  1900 01/22/20  1413   WBC  --  6.5   HGB  --  13.3   HCT  --  41.0   MCV  --  90    333     Recent Labs   Lab Test 01/22/20  1413   HGB 13.3    No results for input(s): \"TROPONINI\" in the last 83441 hours.  Recent Labs   Lab Test 10/02/19  1551   BNP <10     Recent Labs   Lab Test 02/11/22  1408   TSH 1.23     No results for input(s): \"INR\" in the last 28273 hours.                  "

## 2025-03-19 NOTE — PATIENT INSTRUCTIONS
Kristyn Whatley,    It was a pleasure to see you today at [facility].     My recommendations after this visit include:    Blood work and then adjust heart medications    DOMINGO Ray MD, FACC, VEE

## 2025-03-19 NOTE — LETTER
3/19/2025    Shiela Ceja MD  [address]   [place] [address]    RE: Kristyn Whatley       Dear Colleague,     I had the pleasure of seeing Kristyn Whatley in the [facility].      Cardiology Progress Note     Assessment:  Chronic systolic heart failure/nonischemic dilated cardiomyopathy, LVEF 20% at the lowest point, now mildly decreased 40-45%, clinically no fluid overload, stable  Cerebral palsy      Plan:  BMP and BNP today  Continue current cardiac medications  May need decreased dose of furosemide    If BNP significantly elevated we will reassess LV function with echo    Routine follow-up in 1 year  The longitudinal plan of care for the diagnosis(es)/condition(s) as documented were addressed during this visit. Due to the added complexity in care, I will continue to support Kristyn in the subsequent management and with ongoing continuity of care.   Subjective:   This is 35 year old female who comes in today for follow-up visit.  She has done well.  She denies exertional chest pain or shortness of breath.  Her weight has been stable.  She has no PND orthopnea.  She is compliant with medications    Review of Systems:   Negative other than history of present illness    Objective:   /72 (BP Location: Right arm, Patient Position: Sitting, Cuff Size: Adult Regular)   Pulse 76   Wt 46.4 kg (102 lb 3 oz)   SpO2 100%   BMI 22.11 kg/m    Physical Exam:  GENERAL: no distress  NECK: No JVD  LUNGS: Clear to auscultation.  CARDIAC: regular rhythm, S1 & S2 normal.  No heaves, thrills, gallops or murmurs.  ABDOMEN: flat, negative hepatosplenomegaly, soft and non-tender.  EXTREMITIES: No evidence of cyanosis, clubbing or edema.    Current Outpatient Medications   Medication Sig Dispense Refill     carvedilol (COREG) 25 MG tablet Take 1 tablet (25 mg) by mouth 2 times daily (with meals) Instruct pt to request further refills at follow-up appt.  mg 180 tablet 3     furosemide (LASIX)  20 MG tablet TAKE 1 TABLET (20 MG) BY MOUTH DAILY AS NEEDED (FLUID RETENTION) 90 tablet 3     loratadine (CLARITIN) 10 mg tablet Take 10 mg by mouth as needed       losartan (COZAAR) 25 MG tablet TAKE 1/2 TABLET BY G-TUBE ROUTE DAILY 45 tablet 11     triamcinolone (KENALOG) 0.1 % external cream as needed       blood pressure test kit-medium Kit [BLOOD PRESSURE TEST KIT-MEDIUM KIT] Use 1 kit As Directed 3 (three) times a week. (Patient not taking: Reported on 3/19/2025) 1 each 0       Cardiographics:    Cardiac MR August 2015   1. Mildly dilated left ventricle with normal left ventricular wall thickness.   There is moderate global hypokinesis. The quantitative left ventricular ejection   fraction is 42%.   2. In the current study, there is no evidence of delayed gadolinium enhancement   indicating myocardial scar.   3. Normal right ventricular size and function.   4. There is no obvious valvular disease.   5. There is no pericardial effusion.      Echocardiogram:      September 2022  The left ventricle is borderline dilated.  Left ventricular function is decreased. The ejection fraction is 50-55%  (borderline).  Normal right ventricle size and systolic function.  IVC diameter <2.1 cm collapsing >50% with sniff suggests a normal RA pressure  of 3 mmHg.  No hemodynamically significant valvular abnormalities on 2D or color flow  imaging.    March 2024  The left ventricle is normal in size. There is normal left ventricular wall  thickness.  Left ventricular function is decreased. The ejection fraction is 45-50%  (mildly reduced). There is diffuse hypokinesis of the left ventricle.  There are prominent apical trabeculae. 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     The right ventricle is normal in size and function.  Normal left atrial size. Right atrial size is normal.  IVC diameter <2.1 cm collapsing >50% with sniff suggests a normal RA pressure  of 3 mmHg.     Lab Results    Chemistry/lipid CBC Cardiac  "Enzymes/BNP/TSH/INR   Recent Labs   Lab Test 09/16/22  1124   CHOL 139   HDL 41*   LDL 82   TRIG 81     Recent Labs   Lab Test 09/16/22  1124   LDL 82     Recent Labs   Lab Test 04/19/23  0915      POTASSIUM 4.3   CHLORIDE 103   CO2 24   GLC 87   BUN 10.5   CR 0.49*   GFRESTIMATED >90   FAB 9.3     Recent Labs   Lab Test 04/19/23  0915 06/15/21  1900 01/17/20  1148   CR 0.49* 0.61 0.52*     No results for input(s): \"A1C\" in the last 57294 hours.       Recent Labs   Lab Test 06/15/21  1900 01/22/20  1413   WBC  --  6.5   HGB  --  13.3   HCT  --  41.0   MCV  --  90    333     Recent Labs   Lab Test 01/22/20  1413   HGB 13.3    No results for input(s): \"TROPONINI\" in the last 47290 hours.  Recent Labs   Lab Test 10/02/19  1551   BNP <10     Recent Labs   Lab Test 02/11/22  1408   TSH 1.23     No results for input(s): \"INR\" in the last 20746 hours.                      Thank you for allowing me to participate in the care of your patient.      Sincerely,     Domo Ray MD     [facility]  cc:   Jalen Ray MD  [address]  [address]  [place] [address]      "

## 2025-03-20 DIAGNOSIS — I50.22 CHRONIC SYSTOLIC HEART FAILURE (H): ICD-10-CM

## 2025-03-20 RX ORDER — FUROSEMIDE 20 MG/1
20 TABLET ORAL EVERY OTHER DAY
Qty: 55 TABLET | Refills: 3 | Status: SHIPPED | OUTPATIENT
Start: 2025-03-20

## 2025-03-20 NOTE — PROGRESS NOTES
Jalen Ray MD  3/20/2025 10:55 AM CDT Back to Top      Normal results.  Can go to furosemide every other day             Rx list updated and refill granted. Cristy mentioned that Rx is often delayed due to having to come from [place].. Rx written for higher quantity at her request due to this in the event of future delay. -[facility]

## 2025-04-16 DIAGNOSIS — B33.22 DILATED CARDIOMYOPATHY SECONDARY TO VIRAL MYOCARDITIS (H): ICD-10-CM

## 2025-04-16 DIAGNOSIS — I43 DILATED CARDIOMYOPATHY SECONDARY TO VIRAL MYOCARDITIS (H): ICD-10-CM

## 2025-04-16 RX ORDER — CARVEDILOL 25 MG/1
25 TABLET ORAL 2 TIMES DAILY WITH MEALS
Qty: 180 TABLET | Refills: 3 | Status: SHIPPED | OUTPATIENT
Start: 2025-04-16

## 2025-04-17 DIAGNOSIS — I43 DILATED CARDIOMYOPATHY SECONDARY TO VIRAL MYOCARDITIS (H): ICD-10-CM

## 2025-04-17 DIAGNOSIS — B33.22 DILATED CARDIOMYOPATHY SECONDARY TO VIRAL MYOCARDITIS (H): ICD-10-CM

## 2025-04-17 RX ORDER — LOSARTAN POTASSIUM 25 MG/1
TABLET ORAL
Qty: 45 TABLET | Refills: 2 | Status: SHIPPED | OUTPATIENT
Start: 2025-04-17